# Patient Record
Sex: FEMALE | Race: OTHER | NOT HISPANIC OR LATINO | URBAN - METROPOLITAN AREA
[De-identification: names, ages, dates, MRNs, and addresses within clinical notes are randomized per-mention and may not be internally consistent; named-entity substitution may affect disease eponyms.]

---

## 2020-12-08 ENCOUNTER — OUTPATIENT (OUTPATIENT)
Dept: OUTPATIENT SERVICES | Facility: HOSPITAL | Age: 75
LOS: 1 days | Discharge: HOME | End: 2020-12-08
Payer: MEDICAID

## 2020-12-08 DIAGNOSIS — C50.919 MALIGNANT NEOPLASM OF UNSPECIFIED SITE OF UNSPECIFIED FEMALE BREAST: ICD-10-CM

## 2020-12-10 ENCOUNTER — RESULT REVIEW (OUTPATIENT)
Age: 75
End: 2020-12-10

## 2020-12-10 PROCEDURE — 88321 CONSLTJ&REPRT SLD PREP ELSWR: CPT

## 2020-12-10 RX ORDER — CHROMIUM 200 MCG
TABLET ORAL
Refills: 0 | Status: ACTIVE | COMMUNITY

## 2020-12-11 ENCOUNTER — RESULT REVIEW (OUTPATIENT)
Age: 75
End: 2020-12-11

## 2020-12-11 ENCOUNTER — APPOINTMENT (OUTPATIENT)
Dept: HEMATOLOGY ONCOLOGY | Facility: CLINIC | Age: 75
End: 2020-12-11

## 2020-12-11 ENCOUNTER — APPOINTMENT (OUTPATIENT)
Dept: HEMATOLOGY ONCOLOGY | Facility: CLINIC | Age: 75
End: 2020-12-11
Payer: MEDICAID

## 2020-12-11 VITALS
TEMPERATURE: 97.3 F | WEIGHT: 138.67 LBS | HEART RATE: 86 BPM | RESPIRATION RATE: 16 BRPM | SYSTOLIC BLOOD PRESSURE: 111 MMHG | OXYGEN SATURATION: 96 % | BODY MASS INDEX: 28.33 KG/M2 | DIASTOLIC BLOOD PRESSURE: 73 MMHG | HEIGHT: 58.66 IN

## 2020-12-11 LAB
BASOPHILS # BLD AUTO: 0.02 K/UL — SIGNIFICANT CHANGE UP (ref 0–0.2)
BASOPHILS NFR BLD AUTO: 0.2 % — SIGNIFICANT CHANGE UP (ref 0–2)
EOSINOPHIL # BLD AUTO: 0.13 K/UL — SIGNIFICANT CHANGE UP (ref 0–0.5)
EOSINOPHIL NFR BLD AUTO: 1.4 % — SIGNIFICANT CHANGE UP (ref 0–6)
HCT VFR BLD CALC: 33.5 % — LOW (ref 34.5–45)
HGB BLD-MCNC: 10.6 G/DL — LOW (ref 11.5–15.5)
IMM GRANULOCYTES NFR BLD AUTO: 0.4 % — SIGNIFICANT CHANGE UP (ref 0–1.5)
LYMPHOCYTES # BLD AUTO: 2.52 K/UL — SIGNIFICANT CHANGE UP (ref 1–3.3)
LYMPHOCYTES # BLD AUTO: 27.9 % — SIGNIFICANT CHANGE UP (ref 13–44)
MCHC RBC-ENTMCNC: 28.5 PG — SIGNIFICANT CHANGE UP (ref 27–34)
MCHC RBC-ENTMCNC: 31.6 G/DL — LOW (ref 32–36)
MCV RBC AUTO: 90.1 FL — SIGNIFICANT CHANGE UP (ref 80–100)
MONOCYTES # BLD AUTO: 0.57 K/UL — SIGNIFICANT CHANGE UP (ref 0–0.9)
MONOCYTES NFR BLD AUTO: 6.3 % — SIGNIFICANT CHANGE UP (ref 2–14)
NEUTROPHILS # BLD AUTO: 5.74 K/UL — SIGNIFICANT CHANGE UP (ref 1.8–7.4)
NEUTROPHILS NFR BLD AUTO: 63.8 % — SIGNIFICANT CHANGE UP (ref 43–77)
NRBC # BLD: 0 /100 WBCS — SIGNIFICANT CHANGE UP (ref 0–0)
PLATELET # BLD AUTO: 203 K/UL — SIGNIFICANT CHANGE UP (ref 150–400)
RBC # BLD: 3.72 M/UL — LOW (ref 3.8–5.2)
RBC # FLD: 14.7 % — HIGH (ref 10.3–14.5)
WBC # BLD: 9.02 K/UL — SIGNIFICANT CHANGE UP (ref 3.8–10.5)
WBC # FLD AUTO: 9.02 K/UL — SIGNIFICANT CHANGE UP (ref 3.8–10.5)

## 2020-12-11 PROCEDURE — 99072 ADDL SUPL MATRL&STAF TM PHE: CPT

## 2020-12-11 PROCEDURE — 99205 OFFICE O/P NEW HI 60 MIN: CPT

## 2020-12-15 LAB
ALBUMIN SERPL ELPH-MCNC: 4 G/DL
ALP BLD-CCNC: 87 U/L
ALT SERPL-CCNC: 14 U/L
ANION GAP SERPL CALC-SCNC: 14 MMOL/L
AST SERPL-CCNC: 12 U/L
BILIRUB SERPL-MCNC: 0.3 MG/DL
BUN SERPL-MCNC: 18 MG/DL
CALCIUM SERPL-MCNC: 9.8 MG/DL
CHLORIDE SERPL-SCNC: 100 MMOL/L
CO2 SERPL-SCNC: 26 MMOL/L
CREAT SERPL-MCNC: 0.93 MG/DL
GLUCOSE SERPL-MCNC: 174 MG/DL
HAV IGM SER QL: NONREACTIVE
HBV CORE IGM SER QL: NONREACTIVE
HBV SURFACE AG SER QL: NONREACTIVE
HCV AB SER QL: NONREACTIVE
HCV S/CO RATIO: 0.18 S/CO
POTASSIUM SERPL-SCNC: 4.2 MMOL/L
PROT SERPL-MCNC: 7 G/DL
SODIUM SERPL-SCNC: 140 MMOL/L

## 2020-12-17 NOTE — HISTORY OF PRESENT ILLNESS
[de-identified] : 76 y/o female who presents for initial breast medical oncology consultation.\par \par The patient has a history of right breast IDC, ER/MN+, HER2-, [id number], diagnosed in 2015.  The patient underwent a right lumpectomy with SLNB at [facility] with negative margins, 5/5 negative LNs and low oncotype score.  She was followed by radiation therapy completed 3/12/2015.  The patient resided in [facility] and took Arimidex for only 6 months.  She followed up with oncologist [place] [address] and was restarted on the Arimidex 4/20/2016.  \par \par She was followed with routine mammograms annually.  On routine mammogram and sonogram done 10/7/20, a right breast mass at the 8:00 zxis, 7 cm from the nipple was discovered and biopsy was recommended.  The biopsy was done on 10/29/20 and pathology revealed an invasive ductal carcinoma, grade 3 ER/MN negative, HER2 IHC: negative.  \par \par The patient a right mastectomy with SLNB by Dr. Nidhi Wilks on  12/1/20.  Surgical pathology showed: Invasive ductal carcinoma

## 2020-12-24 ENCOUNTER — APPOINTMENT (OUTPATIENT)
Dept: NUCLEAR MEDICINE | Facility: CLINIC | Age: 75
End: 2020-12-24
Payer: MEDICAID

## 2020-12-24 ENCOUNTER — RESULT REVIEW (OUTPATIENT)
Age: 75
End: 2020-12-24

## 2020-12-24 ENCOUNTER — OUTPATIENT (OUTPATIENT)
Dept: OUTPATIENT SERVICES | Facility: HOSPITAL | Age: 75
LOS: 1 days | End: 2020-12-24

## 2020-12-24 DIAGNOSIS — C50.919 MALIGNANT NEOPLASM OF UNSPECIFIED SITE OF UNSPECIFIED FEMALE BREAST: ICD-10-CM

## 2020-12-24 PROCEDURE — 78830 RP LOCLZJ TUM SPECT W/CT 1: CPT | Mod: 26

## 2020-12-24 PROCEDURE — 78306 BONE IMAGING WHOLE BODY: CPT | Mod: 26

## 2021-01-05 ENCOUNTER — LABORATORY RESULT (OUTPATIENT)
Age: 76
End: 2021-01-05

## 2021-01-06 ENCOUNTER — RESULT REVIEW (OUTPATIENT)
Age: 76
End: 2021-01-06

## 2021-01-06 ENCOUNTER — APPOINTMENT (OUTPATIENT)
Dept: INFUSION THERAPY | Facility: HOSPITAL | Age: 76
End: 2021-01-06

## 2021-01-06 ENCOUNTER — LABORATORY RESULT (OUTPATIENT)
Age: 76
End: 2021-01-06

## 2021-01-06 ENCOUNTER — APPOINTMENT (OUTPATIENT)
Dept: HEMATOLOGY ONCOLOGY | Facility: CLINIC | Age: 76
End: 2021-01-06
Payer: MEDICAID

## 2021-01-06 VITALS
HEIGHT: 58.66 IN | BODY MASS INDEX: 28.69 KG/M2 | SYSTOLIC BLOOD PRESSURE: 126 MMHG | TEMPERATURE: 98.2 F | WEIGHT: 140.43 LBS | HEART RATE: 96 BPM | RESPIRATION RATE: 16 BRPM | DIASTOLIC BLOOD PRESSURE: 77 MMHG | OXYGEN SATURATION: 97 %

## 2021-01-06 LAB
BASOPHILS # BLD AUTO: 0.04 K/UL — SIGNIFICANT CHANGE UP (ref 0–0.2)
BASOPHILS NFR BLD AUTO: 0.5 % — SIGNIFICANT CHANGE UP (ref 0–2)
EOSINOPHIL # BLD AUTO: 0.14 K/UL — SIGNIFICANT CHANGE UP (ref 0–0.5)
EOSINOPHIL NFR BLD AUTO: 1.7 % — SIGNIFICANT CHANGE UP (ref 0–6)
HCT VFR BLD CALC: 34.9 % — SIGNIFICANT CHANGE UP (ref 34.5–45)
HGB BLD-MCNC: 10.7 G/DL — LOW (ref 11.5–15.5)
IMM GRANULOCYTES NFR BLD AUTO: 1.1 % — SIGNIFICANT CHANGE UP (ref 0–1.5)
LYMPHOCYTES # BLD AUTO: 2.23 K/UL — SIGNIFICANT CHANGE UP (ref 1–3.3)
LYMPHOCYTES # BLD AUTO: 26.5 % — SIGNIFICANT CHANGE UP (ref 13–44)
MCHC RBC-ENTMCNC: 27 PG — SIGNIFICANT CHANGE UP (ref 27–34)
MCHC RBC-ENTMCNC: 30.7 G/DL — LOW (ref 32–36)
MCV RBC AUTO: 87.9 FL — SIGNIFICANT CHANGE UP (ref 80–100)
MONOCYTES # BLD AUTO: 0.58 K/UL — SIGNIFICANT CHANGE UP (ref 0–0.9)
MONOCYTES NFR BLD AUTO: 6.9 % — SIGNIFICANT CHANGE UP (ref 2–14)
NEUTROPHILS # BLD AUTO: 5.34 K/UL — SIGNIFICANT CHANGE UP (ref 1.8–7.4)
NEUTROPHILS NFR BLD AUTO: 63.3 % — SIGNIFICANT CHANGE UP (ref 43–77)
NRBC # BLD: 0 /100 WBCS — SIGNIFICANT CHANGE UP (ref 0–0)
PLATELET # BLD AUTO: 155 K/UL — SIGNIFICANT CHANGE UP (ref 150–400)
RBC # BLD: 3.97 M/UL — SIGNIFICANT CHANGE UP (ref 3.8–5.2)
RBC # FLD: 14.6 % — HIGH (ref 10.3–14.5)
WBC # BLD: 8.42 K/UL — SIGNIFICANT CHANGE UP (ref 3.8–10.5)
WBC # FLD AUTO: 8.42 K/UL — SIGNIFICANT CHANGE UP (ref 3.8–10.5)

## 2021-01-06 PROCEDURE — 99215 OFFICE O/P EST HI 40 MIN: CPT

## 2021-01-06 PROCEDURE — 99072 ADDL SUPL MATRL&STAF TM PHE: CPT

## 2021-01-06 RX ORDER — METOCLOPRAMIDE 10 MG/1
10 TABLET ORAL EVERY 6 HOURS
Qty: 120 | Refills: 1 | Status: ACTIVE | COMMUNITY
Start: 2021-01-06 | End: 1900-01-01

## 2021-01-07 ENCOUNTER — NON-APPOINTMENT (OUTPATIENT)
Age: 76
End: 2021-01-07

## 2021-01-07 DIAGNOSIS — Z51.11 ENCOUNTER FOR ANTINEOPLASTIC CHEMOTHERAPY: ICD-10-CM

## 2021-01-07 DIAGNOSIS — Z51.89 ENCOUNTER FOR OTHER SPECIFIED AFTERCARE: ICD-10-CM

## 2021-01-07 DIAGNOSIS — R11.2 NAUSEA WITH VOMITING, UNSPECIFIED: ICD-10-CM

## 2021-01-07 PROBLEM — E78.5 HYPERLIPIDEMIA, UNSPECIFIED: Chronic | Status: ACTIVE | Noted: 2021-01-06

## 2021-01-07 PROBLEM — I10 ESSENTIAL (PRIMARY) HYPERTENSION: Chronic | Status: ACTIVE | Noted: 2021-01-06

## 2021-01-07 PROBLEM — M81.0 AGE-RELATED OSTEOPOROSIS WITHOUT CURRENT PATHOLOGICAL FRACTURE: Chronic | Status: ACTIVE | Noted: 2021-01-06

## 2021-01-07 PROBLEM — M19.90 UNSPECIFIED OSTEOARTHRITIS, UNSPECIFIED SITE: Chronic | Status: ACTIVE | Noted: 2021-01-06

## 2021-01-07 PROBLEM — E11.9 TYPE 2 DIABETES MELLITUS WITHOUT COMPLICATIONS: Chronic | Status: ACTIVE | Noted: 2021-01-06

## 2021-01-09 NOTE — ASSESSMENT
[FreeTextEntry1] : In summary, Ms. ROSEANN LU is a 75 year old female with history of stage I right breast infiltrating ductal carcinoma, ER/VA positive and HER-2 negative diagnosed in 2015.  Oncotype score was 11.  She underwent breast conservation surgery, radiation and completed anastrozole for 5 years.  Now she is diagnosed with right breast new primary.  She underwent right mastectomy on 12/1/2020 which showed a poorly differentiated 1.7cm invasive ductal carcinoma. T1c Nx Mx Stage IA.  Lymph nodes were not detected due to previous history of sentinel lymph node dissection.  Tumor was triple negative. Patient is elderly, with well-managed comorbidities and overall good performance status.  \par \par - Breast ca: On adjuvant ddCMF chemo. C 1/8 today. S/e and adjunctive meds reviewed. \par - Chemo induced anemia- Grade 1. No transfusion needed. Monitor counts.  \par - Chemo induced diarrhea-  Imodium PRN. Maintain PO hydration. BRAT diet\par - Chemo induced N/V- Will get premedications with Emend, dexamethasone and Aloxi. She will continue dexamethasone for next 3 days for antinausea prophylaxis. She will use Reglan as needed. \par - GF induced bone pain- She will take Claritin. \par - Chemo induced dysgeusia and fatigue: Encourage p.o. fluids, small frequent meals.\par - Instructed to call office and go directly to the emergency room with fever more than 100.4, shaking chills, productive cough, sore throat, shortness of breath or urinary symptoms. Patient verbalized understanding and agreement.\par - Emotional support provided, all questions answered.\par - CT scans 12/2020 reviewed. Adrenal thickening is non specific. Will repeat CT afte completion of chemo. Bone scan MARIE 12/2020\par - She is referred for genetic counselling and testing. \par \par RTO 2 weeks

## 2021-01-09 NOTE — CONSULT LETTER
[Dear  ___] : Dear  [unfilled], [Consult Letter:] : I had the pleasure of evaluating your patient, [unfilled]. [Please see my note below.] : Please see my note below. [Consult Closing:] : Thank you very much for allowing me to participate in the care of this patient.  If you have any questions, please do not hesitate to contact me. [Sincerely,] : Sincerely, [FreeTextEntry3] : Nayeli Escobedo MD\par Division of Medical Oncology and Hematology\par [facility]\par [employer] [facility]\par [place], [place]\par \par

## 2021-01-09 NOTE — PHYSICAL EXAM
[Restricted in physically strenuous activity but ambulatory and able to carry out work of a light or sedentary nature] : Status 1- Restricted in physically strenuous activity but ambulatory and able to carry out work of a light or sedentary nature, e.g., light house work, office work [Normal] : affect appropriate [de-identified] : Right mastectomy site is healing well.

## 2021-01-09 NOTE — HISTORY OF PRESENT ILLNESS
[T: ___] : T[unfilled] [N: ___] : N[unfilled] [AJCC Stage: ____] : AJCC Stage: [unfilled] [de-identified] : 74 y/o female who presents for initial breast medical oncology consultation.\par \par The patient has a history of right breast IDC, ER/ID+, HER2-, [id number], diagnosed in 2015.  The patient underwent a right lumpectomy with SLNB at [facility] with negative margins, 5/5 negative LNs and low Oncotype score.  She was followed by radiation therapy completed 3/12/2015.  The patient resided in [facility] and took Arimidex for 5 years, completed 1/2020. Dr Linares \par \par She was followed with routine mammograms annually.  Mammogram in [facility] 3/2020- abnormal. Could not get care due to the pandemic. [place] and underwent imaging on 10/7/20. A right breast mass at the 8:00 axis, 7 cm from the nipple was discovered and biopsy was recommended.  The biopsy was done on 10/29/20 and pathology revealed an invasive ductal carcinoma, grade 3 ER/ID negative, HER2 IHC: negative.  \par \par The patient underwent a right mastectomy with SLNB by Dr. Nidhi Wilks from [facility] on  12/1/20.  Surgical pathology showed: Invasive ductal carcinoma   (1.7 cm, microscopic measurement), grade 3. No angiolymphatic or perineural invasion identified. Tumor is 3 cm from posterior margin. Monckaberg's arteriosclerosis.  No lymph nodes were discovered during surgery, therefore none submitted.\par \par Pertinent History:\par PMD: Dr. Cox\par PMH: HTN, DM\par Osteoporosis on Alendronate \par The patient takes gabapentin from hip pain\par FH: Daughter with history of sarcoma of the leg; Another daughter history of uterine cancer.  \par The patient is from [facility], she is a , has three children and lives with her daughter and son-in- law\par \par Grand daughter felt communication was very difficult with [facility] and switched care to [facility]. \par \par Fosamax stopped after 5 years\par Patient is very active, spending most day doing chores. Lately has been getting tired easily. Uses cane due to arthritis pain. Independent in ADL \par  [de-identified] : Ms. ROSEANN LU  is here for a follow up appt for right breast TNBC dx 10/2020, s/p right mastectomy. \par Starting chemotherapy Today 1/6/2021, adjuvant ddCMF\par Meds reviewed. s/e reviewed again with pt and family member\par She is nervous about starting treatment, I reassured her appropriately and instructed to call if any side effects. \par Grand daughter Jennifer Davenport  on phone today\par CT scans 12/2020 reviewed. Adrenal thickening is non specific. Will repeat CT afte completion of chemo. Bone scan MARIE 12/2020

## 2021-01-09 NOTE — REASON FOR VISIT
[Other: _____] : [unfilled] [Follow-Up Visit] : a follow-up [FreeTextEntry2] : Right Breast Cancer [FreeTextEntry3] : Kinyarwanda speaking, family member translating per pt's request

## 2021-01-12 ENCOUNTER — APPOINTMENT (OUTPATIENT)
Dept: PEDIATRIC MEDICAL GENETICS | Facility: CLINIC | Age: 76
End: 2021-01-12
Payer: MEDICAID

## 2021-01-12 PROCEDURE — 99442: CPT | Mod: 95

## 2021-01-12 NOTE — HISTORY OF PRESENT ILLNESS
[Home] : at home, [unfilled] , at the time of the visit. [Other Location: e.g. Home (Enter Location, City,State)___] : at [unfilled] [Other:____] : [unfilled] [Verbal consent obtained from patient] : the patient, [unfilled]

## 2021-01-14 ENCOUNTER — OUTPATIENT (OUTPATIENT)
Dept: OUTPATIENT SERVICES | Facility: HOSPITAL | Age: 76
LOS: 1 days | Discharge: HOME | End: 2021-01-14

## 2021-01-14 DIAGNOSIS — Z90.11 ACQUIRED ABSENCE OF RIGHT BREAST AND NIPPLE: Chronic | ICD-10-CM

## 2021-01-14 DIAGNOSIS — C50.919 MALIGNANT NEOPLASM OF UNSPECIFIED SITE OF UNSPECIFIED FEMALE BREAST: ICD-10-CM

## 2021-01-20 ENCOUNTER — APPOINTMENT (OUTPATIENT)
Dept: HEMATOLOGY ONCOLOGY | Facility: CLINIC | Age: 76
End: 2021-01-20

## 2021-01-20 ENCOUNTER — RESULT REVIEW (OUTPATIENT)
Age: 76
End: 2021-01-20

## 2021-01-20 ENCOUNTER — APPOINTMENT (OUTPATIENT)
Dept: INFUSION THERAPY | Facility: HOSPITAL | Age: 76
End: 2021-01-20

## 2021-01-20 ENCOUNTER — LABORATORY RESULT (OUTPATIENT)
Age: 76
End: 2021-01-20

## 2021-01-20 ENCOUNTER — APPOINTMENT (OUTPATIENT)
Dept: HEMATOLOGY ONCOLOGY | Facility: CLINIC | Age: 76
End: 2021-01-20
Payer: MEDICAID

## 2021-01-20 VITALS
SYSTOLIC BLOOD PRESSURE: 129 MMHG | TEMPERATURE: 97.9 F | WEIGHT: 140.43 LBS | BODY MASS INDEX: 28.69 KG/M2 | HEIGHT: 58.66 IN | DIASTOLIC BLOOD PRESSURE: 79 MMHG | HEART RATE: 97 BPM | OXYGEN SATURATION: 97 % | RESPIRATION RATE: 16 BRPM

## 2021-01-20 LAB
BASOPHILS # BLD AUTO: 0.08 K/UL — SIGNIFICANT CHANGE UP (ref 0–0.2)
BASOPHILS NFR BLD AUTO: 0.7 % — SIGNIFICANT CHANGE UP (ref 0–2)
EOSINOPHIL # BLD AUTO: 0.05 K/UL — SIGNIFICANT CHANGE UP (ref 0–0.5)
EOSINOPHIL NFR BLD AUTO: 0.4 % — SIGNIFICANT CHANGE UP (ref 0–6)
HCT VFR BLD CALC: 32.9 % — LOW (ref 34.5–45)
HGB BLD-MCNC: 10.4 G/DL — LOW (ref 11.5–15.5)
IMM GRANULOCYTES NFR BLD AUTO: 7 % — HIGH (ref 0–1.5)
LYMPHOCYTES # BLD AUTO: 1.92 K/UL — SIGNIFICANT CHANGE UP (ref 1–3.3)
LYMPHOCYTES # BLD AUTO: 16.9 % — SIGNIFICANT CHANGE UP (ref 13–44)
MCHC RBC-ENTMCNC: 27.3 PG — SIGNIFICANT CHANGE UP (ref 27–34)
MCHC RBC-ENTMCNC: 31.6 G/DL — LOW (ref 32–36)
MCV RBC AUTO: 86.4 FL — SIGNIFICANT CHANGE UP (ref 80–100)
MONOCYTES # BLD AUTO: 0.87 K/UL — SIGNIFICANT CHANGE UP (ref 0–0.9)
MONOCYTES NFR BLD AUTO: 7.6 % — SIGNIFICANT CHANGE UP (ref 2–14)
NEUTROPHILS # BLD AUTO: 7.67 K/UL — HIGH (ref 1.8–7.4)
NEUTROPHILS NFR BLD AUTO: 67.4 % — SIGNIFICANT CHANGE UP (ref 43–77)
NRBC # BLD: 0 /100 WBCS — SIGNIFICANT CHANGE UP (ref 0–0)
PLATELET # BLD AUTO: 97 K/UL — LOW (ref 150–400)
RBC # BLD: 3.81 M/UL — SIGNIFICANT CHANGE UP (ref 3.8–5.2)
RBC # FLD: 15.7 % — HIGH (ref 10.3–14.5)
WBC # BLD: 11.39 K/UL — HIGH (ref 3.8–10.5)
WBC # FLD AUTO: 11.39 K/UL — HIGH (ref 3.8–10.5)

## 2021-01-20 PROCEDURE — 99215 OFFICE O/P EST HI 40 MIN: CPT

## 2021-01-20 PROCEDURE — 99072 ADDL SUPL MATRL&STAF TM PHE: CPT

## 2021-01-27 ENCOUNTER — RESULT REVIEW (OUTPATIENT)
Age: 76
End: 2021-01-27

## 2021-01-27 ENCOUNTER — APPOINTMENT (OUTPATIENT)
Dept: INFUSION THERAPY | Facility: HOSPITAL | Age: 76
End: 2021-01-27

## 2021-01-27 ENCOUNTER — APPOINTMENT (OUTPATIENT)
Dept: HEMATOLOGY ONCOLOGY | Facility: CLINIC | Age: 76
End: 2021-01-27
Payer: MEDICAID

## 2021-01-27 ENCOUNTER — LABORATORY RESULT (OUTPATIENT)
Age: 76
End: 2021-01-27

## 2021-01-27 VITALS
WEIGHT: 138.89 LBS | RESPIRATION RATE: 18 BRPM | TEMPERATURE: 98.1 F | BODY MASS INDEX: 28.38 KG/M2 | OXYGEN SATURATION: 96 % | SYSTOLIC BLOOD PRESSURE: 119 MMHG | HEIGHT: 58.66 IN | HEART RATE: 98 BPM | DIASTOLIC BLOOD PRESSURE: 74 MMHG

## 2021-01-27 LAB
BASOPHILS # BLD AUTO: 0.03 K/UL — SIGNIFICANT CHANGE UP (ref 0–0.2)
BASOPHILS NFR BLD AUTO: 0.3 % — SIGNIFICANT CHANGE UP (ref 0–2)
EOSINOPHIL # BLD AUTO: 0.06 K/UL — SIGNIFICANT CHANGE UP (ref 0–0.5)
EOSINOPHIL NFR BLD AUTO: 0.6 % — SIGNIFICANT CHANGE UP (ref 0–6)
HCT VFR BLD CALC: 31.4 % — LOW (ref 34.5–45)
HGB BLD-MCNC: 10.2 G/DL — LOW (ref 11.5–15.5)
IMM GRANULOCYTES NFR BLD AUTO: 1.1 % — SIGNIFICANT CHANGE UP (ref 0–1.5)
LYMPHOCYTES # BLD AUTO: 2.36 K/UL — SIGNIFICANT CHANGE UP (ref 1–3.3)
LYMPHOCYTES # BLD AUTO: 23.1 % — SIGNIFICANT CHANGE UP (ref 13–44)
MCHC RBC-ENTMCNC: 27.8 PG — SIGNIFICANT CHANGE UP (ref 27–34)
MCHC RBC-ENTMCNC: 32.5 G/DL — SIGNIFICANT CHANGE UP (ref 32–36)
MCV RBC AUTO: 85.6 FL — SIGNIFICANT CHANGE UP (ref 80–100)
MONOCYTES # BLD AUTO: 0.64 K/UL — SIGNIFICANT CHANGE UP (ref 0–0.9)
MONOCYTES NFR BLD AUTO: 6.3 % — SIGNIFICANT CHANGE UP (ref 2–14)
NEUTROPHILS # BLD AUTO: 7.01 K/UL — SIGNIFICANT CHANGE UP (ref 1.8–7.4)
NEUTROPHILS NFR BLD AUTO: 68.6 % — SIGNIFICANT CHANGE UP (ref 43–77)
NRBC # BLD: 0 /100 WBCS — SIGNIFICANT CHANGE UP (ref 0–0)
PLATELET # BLD AUTO: 164 K/UL — SIGNIFICANT CHANGE UP (ref 150–400)
RBC # BLD: 3.67 M/UL — LOW (ref 3.8–5.2)
RBC # FLD: 16.4 % — HIGH (ref 10.3–14.5)
WBC # BLD: 10.21 K/UL — SIGNIFICANT CHANGE UP (ref 3.8–10.5)
WBC # FLD AUTO: 10.21 K/UL — SIGNIFICANT CHANGE UP (ref 3.8–10.5)

## 2021-01-27 PROCEDURE — 99072 ADDL SUPL MATRL&STAF TM PHE: CPT

## 2021-01-27 PROCEDURE — 99214 OFFICE O/P EST MOD 30 MIN: CPT

## 2021-01-28 DIAGNOSIS — Z51.11 ENCOUNTER FOR ANTINEOPLASTIC CHEMOTHERAPY: ICD-10-CM

## 2021-01-28 DIAGNOSIS — R11.2 NAUSEA WITH VOMITING, UNSPECIFIED: ICD-10-CM

## 2021-01-28 DIAGNOSIS — Z51.89 ENCOUNTER FOR OTHER SPECIFIED AFTERCARE: ICD-10-CM

## 2021-02-01 NOTE — ASSESSMENT
[FreeTextEntry1] : In summary, Ms. ROSEANN LU is a 75 year old female with history of stage I right breast infiltrating ductal carcinoma, ER/VT positive and HER-2 negative diagnosed in 2015.  Oncotype score was 11.  She underwent breast conservation surgery, radiation and completed anastrozole for 5 years.  Now she is diagnosed with right breast new primary.  She underwent right mastectomy on 12/1/2020 which showed a poorly differentiated 1.7cm invasive ductal carcinoma. T1c Nx Mx Stage IA.  Lymph nodes were not detected due to previous history of sentinel lymph node dissection.  Tumor was triple negative. Patient is elderly, with well-managed comorbidities and overall good performance status.  \par \par - Breast ca: On adjuvant CMF chemo. Pt could not tolerate q2w dose dense regimen therefore switched to q3w. C 2/8 today. Chemo tolerated with expected side effects 1/20/2021 PLT 97,000 chemo HELD and rescheduled for 1/27/21.. PLT count recovered today 1/27/2021 PLTS 164,000. \par -Chemo induces thrombocytopenia - Grade 1 Will HOLD CHEMO . Reschedule for 1/27/2021 then resume at q 3 weeks\par - Chemo induced anemia- Grade 1. No transfusion needed. Monitor counts.  \par - Chemo induced diarrhea-  Imodium PRN. Maintain PO hydration. BRAT diet\par - Chemo induced N/V- Will get premedications with Emend, dexamethasone and Aloxi. She will continue dexamethasone for next 3 days for antinausea prophylaxis. She will use Reglan as needed. \par - GF induced bone pain- She will take Claritin. \par - Chemo induced dysgeusia and fatigue: Encourage p.o. fluids, small frequent meals.\par - Instructed to call office and go directly to the emergency room with fever more than 100.4, shaking chills, productive cough, sore throat, shortness of breath or urinary symptoms. Patient verbalized understanding and agreement.\par - Emotional support provided, all questions answered.\par - CT scans 12/2020 reviewed. Adrenal thickening is non specific. Will repeat CT after completion of chemo. Bone scan MARIE 12/2020\par - She is referred for genetic counselling and testing. \par \par RTO 3 weeks for CMF with evert and MD\par D/w  Dr. Nayeli Escobedo\par

## 2021-02-01 NOTE — HISTORY OF PRESENT ILLNESS
[T: ___] : T[unfilled] [N: ___] : N[unfilled] [AJCC Stage: ____] : AJCC Stage: [unfilled] [de-identified] : 74 y/o female who presents for initial breast medical oncology consultation.\par \par The patient has a history of right breast IDC, ER/KS+, HER2-, [id number], diagnosed in 2015.  The patient underwent a right lumpectomy with SLNB at [facility] with negative margins, 5/5 negative LNs and low Oncotype score.  She was followed by radiation therapy completed 3/12/2015.  The patient resided in [facility] and took Arimidex for 5 years, completed 1/2020. Dr Linarse \par \par She was followed with routine mammograms annually.  Mammogram in [facility] 3/2020- abnormal. Could not get care due to the pandemic. [place] and [place] imaging on 10/7/20. A right breast mass at the 8:00 axis, 7 cm from the nipple was discovered and biopsy was recommended.  The biopsy was done on 10/29/20 and pathology revealed an invasive ductal carcinoma, grade 3 ER/KS negative, HER2 IHC: negative.  \par \par The patient underwent a right mastectomy with SLNB by Dr. Nidhi Wilks from [facility] on  12/1/20.  Surgical pathology showed: Invasive ductal carcinoma   (1.7 cm, microscopic measurement), grade 3. No angiolymphatic or perineural invasion identified. Tumor is 3 cm from posterior margin. Monckaberg's arteriosclerosis.  No lymph nodes were discovered during surgery, therefore none submitted.\par \par Pertinent History:\par PMD: Dr. Cox\par PMH: HTN, DM\par Osteoporosis on Alendronate \par The patient takes gabapentin from hip pain\par FH: Daughter with history of sarcoma of the leg; Another daughter history of uterine cancer.  \par The patient is from [facility], she is a , has three children and lives with her daughter and son-in- law\par \par Grand daughter felt communication was very difficult with [facility] and switched care to [facility]. \par \par Fosamax stopped after 5 years\par Patient is very active, spending most day doing chores. Lately has been getting tired easily. Uses cane due to arthritis pain. Independent in ADL \par  [de-identified] : Ms. ROSEANN LU  is here for a follow up appt for right breast TNBC dx 10/2020, s/p right mastectomy.  1/6/2021 started adjuvant dd CMF chemo q2 weeks\par  S/p CMF #1 Here for C2  held 1/20/2021 due to thrombocytopenia, rescheduled for 1/27/2021                             \par Grand daughter Jennifer Davenport  on phone today\par CT scans 12/2020 reviewed. Adrenal thickening is non specific. Will repeat CT after completion of chemo. Bone scan MARIE 12/2020\par Tolerating chemo well. She reports mild-moderate fatigue and altered taste x 3-4 days after chemo. She was able to function, maintained PO intake, weight stable. She had mild nausea, took Reglan, no vomiting, no diarrhea or mouth sores. She had mild bone pain/head aches. No neuropathy, no fevers. No hair loss. 1/20/20210 grade 2 thrombocytopenia  PLT 97,000  denies active bleeding. HOLD CHEMO today reschedule for 1/27/2020. Discussed to change to q 3 weeks as her counts didn’t recover in time for q2w regimen.\par 1/27/2021 Reports mild erythema and pain on palpation to 1/20.2021  IV site on dorsum of right hand. No swelling, streaking or  other s/s of infection noted.  Patient did not receive chemo 1/20/21 so no risk of vein irritation from chemo. . advised to apply warm compress  Counts good .000, Hgb 10.2 We will proceed with CMF C2 today 1/27/2021 and continue CMF with Onpro every 3 weeks going forward. \par

## 2021-02-01 NOTE — REASON FOR VISIT
[Follow-Up Visit] : a follow-up [Other: _____] : [unfilled] [FreeTextEntry2] : Right Breast Cancer [FreeTextEntry3] : Romansh speaking, family member, granddaughter on speaker phone translating per pt's request

## 2021-02-01 NOTE — CONSULT LETTER
[Dear  ___] : Dear  [unfilled], [Consult Letter:] : I had the pleasure of evaluating your patient, [unfilled]. [Please see my note below.] : Please see my note below. [Consult Closing:] : Thank you very much for allowing me to participate in the care of this patient.  If you have any questions, please do not hesitate to contact me. [Sincerely,] : Sincerely, [FreeTextEntry3] : Nayeli Escobedo MD\par Division of Medical Oncology and Hematology\par [facility]\par [employer] [facility]\par [place], [place]\par \par

## 2021-02-01 NOTE — PHYSICAL EXAM
[Restricted in physically strenuous activity but ambulatory and able to carry out work of a light or sedentary nature] : Status 1- Restricted in physically strenuous activity but ambulatory and able to carry out work of a light or sedentary nature, e.g., light house work, office work [Normal] : affect appropriate [de-identified] : Mild erythema noted to dorsum of right hand at IV site from 1/20/21 no swelling or s/s phlebitis noted  [de-identified] : Right mastectomy site is healing well.

## 2021-02-02 NOTE — HISTORY OF PRESENT ILLNESS
[T: ___] : T[unfilled] [N: ___] : N[unfilled] [AJCC Stage: ____] : AJCC Stage: [unfilled] [de-identified] : 76 y/o female who presents for initial breast medical oncology consultation.\par \par The patient has a history of right breast IDC, ER/CO+, HER2-, [id number], diagnosed in 2015.  The patient underwent a right lumpectomy with SLNB at [facility] with negative margins, 5/5 negative LNs and low Oncotype score.  She was followed by radiation therapy completed 3/12/2015.  The patient resided in [facility] and took Arimidex for 5 years, completed 1/2020. Dr Linares \par \par She was followed with routine mammograms annually.  Mammogram in [facility] 3/2020- abnormal. Could not get care due to the pandemic. [place] on 10/7/20. A right breast mass at the 8:00 axis, 7 cm from the nipple was discovered and biopsy was recommended.  The biopsy was done on 10/29/20 and pathology revealed an invasive ductal carcinoma, grade 3 ER/CO negative, HER2 IHC: negative.  \par \par The patient underwent a right mastectomy with SLNB by Dr. Nidhi Wilks from [facility] on  12/1/20.  Surgical pathology showed: Invasive ductal carcinoma   (1.7 cm, microscopic measurement), grade 3. No angiolymphatic or perineural invasion identified. Tumor is 3 cm from posterior margin. Monckaberg's arteriosclerosis.  No lymph nodes were discovered during surgery, therefore none submitted.\par \par Pertinent History:\par PMD: Dr. Cox\par PMH: HTN, DM\par Osteoporosis on Alendronate \par The patient takes gabapentin from hip pain\par FH: Daughter with history of sarcoma of the leg; Another daughter history of uterine cancer.  \par The patient is from [facility], she is a , has three children and lives with her daughter and son-in- law\par \par Grand daughter felt communication was very difficult with [facility] and [facility] care to [facility]. \par \par Fosamax stopped after 5 years\par Patient is very active, spending most day doing chores. Lately has been getting tired easily. Uses cane due to arthritis pain. Independent in ADL \par  [de-identified] : Ms. ROSEANN LU  is here for a follow up appt for right breast TNBC dx 10/2020, s/p right mastectomy.  1/6/2021 started adjuvant dd CMF chemo q2 weeks\par  S/p CMF #1 Here for C2 today 1/20/2021                             \par Meds reviewed. s/e reviewed again with pt and family member\par She is nervous about starting treatment, I reassured her appropriately and instructed to call if any side effects. \par Grand daughter Jennifer Davenport  on phone today\par CT scans 12/2020 reviewed. Adrenal thickening is non specific. Will repeat CT after completion of chemo. Bone scan MARIE 12/2020\par Tolerating well. She reports mild-moderate fatigue and altered taste x 3-4 days after chemo. She was able to function, maintained PO intake, weight stable. She had mild nausea, took Reglan, no vomiting, no diarrhea or mouth sores. She had mild bone pain/head aches. No neuropathy, no fevers. No hair loss. 1/20/20210 grade 1 thrombocytopenia today PLT 97,000  denies active bleeding. HOLD CHEMO today reschedule for 1/27/2020 then q 3 weeks.\par \par \par

## 2021-02-02 NOTE — REASON FOR VISIT
[Follow-Up Visit] : a follow-up [Other: _____] : [unfilled] [FreeTextEntry2] : Right Breast Cancer [FreeTextEntry3] : Swedish speaking, family member translating per pt's request

## 2021-02-02 NOTE — ASSESSMENT
[FreeTextEntry1] : In summary, Ms. ROSEANN LU is a 75 year old female with history of stage I right breast infiltrating ductal carcinoma, ER/AL positive and HER-2 negative diagnosed in 2015.  Oncotype score was 11.  She underwent breast conservation surgery, radiation and completed anastrozole for 5 years.  Now she is diagnosed with right breast new primary.  She underwent right mastectomy on 12/1/2020 which showed a poorly differentiated 1.7cm invasive ductal carcinoma. T1c Nx Mx Stage IA.  Lymph nodes were not detected due to previous history of sentinel lymph node dissection.  Tumor was triple negative. Patient is elderly, with well-managed comorbidities and overall good performance status.  \par \par - Breast ca: On adjuvant ddCMF chemo. C 2/8 today. S/e and adjunctive meds reviewed. Tolerated with expected side effects 1/20/2021 PLT 97,000 we will hold chemo today and esume at q 3 weeks instead of every 2 week for better count recovery.\par -Chemo induces thrombocytopenia - Grade 1 Will HOLD CHEMO today. Reschedule for 1/27/2021 then resume at q 3 weeks\par - Chemo induced anemia- Grade 1. No transfusion needed. Monitor counts.  \par - Chemo induced diarrhea-  Imodium PRN. Maintain PO hydration. BRAT diet\par - Chemo induced N/V- Will get premedications with Emend, dexamethasone and Aloxi. She will continue dexamethasone for next 3 days for antinausea prophylaxis. She will use Reglan as needed. \par - GF induced bone pain- She will take Claritin. \par - Chemo induced dysgeusia and fatigue: Encourage p.o. fluids, small frequent meals.\par - Instructed to call office and go directly to the emergency room with fever more than 100.4, shaking chills, productive cough, sore throat, shortness of breath or urinary symptoms. Patient verbalized understanding and agreement.\par - Emotional support provided, all questions answered.\par - CT scans 12/2020 reviewed. Adrenal thickening is non specific. Will repeat CT afte completion of chemo. Bone scan MARIE 12/2020\par - She is referred for genetic counselling and testing. \par \par 1/20/21 CHEMO HOLD\par RTO next week for tx 1/27/2021\par RTO 3 weeks

## 2021-02-02 NOTE — ASSESSMENT
[FreeTextEntry1] : In summary, Ms. ROSEANN LU is a 75 year old female with history of stage I right breast infiltrating ductal carcinoma, ER/[place] positive and HER-2 negative diagnosed in 2015.  Oncotype score was 11.  She underwent breast conservation surgery, radiation and completed anastrozole for 5 years.  Now she is diagnosed with right breast new primary.  She underwent right mastectomy on 12/1/2020 which showed a poorly differentiated 1.7cm invasive ductal carcinoma. T1c Nx Mx Stage IA.  Lymph nodes were not detected due to previous history of sentinel lymph node dissection.  Tumor was triple negative. Patient is elderly, with well-managed comorbidities and overall good performance status.  \par \par - Breast ca: On adjuvant ddCMF chemo. C 2/8 today. S/e and adjunctive meds reviewed. Tolerated with expected side effects 1/20/2021 PLT 97,000 we will hold chemo today and esume at q 3 weeks instead of every 2 week for better count recovery.\par -Chemo induces thrombocytopenia - Grade 1 Will HOLD CHEMO today. Reschedule for 1/27/2021 then resume at q 3 weeks\par - Chemo induced anemia- Grade 1. No transfusion needed. Monitor counts.  \par - Chemo induced diarrhea-  Imodium PRN. Maintain PO hydration. BRAT diet\par - Chemo induced N/V- Will get premedications with Emend, dexamethasone and Aloxi. She will continue dexamethasone for next 3 days for antinausea prophylaxis. She will use Reglan as needed. \par - GF induced bone pain- She will take Claritin. \par - Chemo induced dysgeusia and fatigue: Encourage p.o. fluids, small frequent meals.\par - Instructed to call office and go directly to the emergency room with fever more than 100.4, shaking chills, productive cough, sore throat, shortness of breath or urinary symptoms. Patient verbalized understanding and agreement.\par - Emotional support provided, all questions answered.\par - CT scans 12/2020 reviewed. Adrenal thickening is non specific. Will repeat CT afte completion of chemo. Bone scan MARIE 12/2020\par - She is referred for genetic counselling and testing. \par \par 1/20/21 CHEMO HOLD\par RTO next week for tx 1/27/2021\par RTO 3 weeks fair plus

## 2021-02-02 NOTE — PHYSICAL EXAM
[Restricted in physically strenuous activity but ambulatory and able to carry out work of a light or sedentary nature] : Status 1- Restricted in physically strenuous activity but ambulatory and able to carry out work of a light or sedentary nature, e.g., light house work, office work [Normal] : affect appropriate [de-identified] : Right mastectomy site is healing well.

## 2021-02-02 NOTE — HISTORY OF PRESENT ILLNESS
[T: ___] : T[unfilled] [N: ___] : N[unfilled] [AJCC Stage: ____] : AJCC Stage: [unfilled] [de-identified] : 76 y/o female who presents for initial breast medical oncology consultation.\par \par The patient has a history of right breast IDC, ER/MD+, HER2-, [id number], diagnosed in 2015.  The patient underwent a right lumpectomy with SLNB at [facility] with negative margins, 5/5 negative LNs and low Oncotype score.  She was followed by radiation therapy completed 3/12/2015.  The patient resided in [facility] and took Arimidex for 5 years, completed 1/2020. Dr Linares \par \par She was followed with routine mammograms annually.  Mammogram in [facility] 3/2020- abnormal. Could not get care due to the pandemic. [place] and underwent imaging on 10/7/20. A right breast mass at the 8:00 axis, 7 cm from the nipple was discovered and biopsy was recommended.  The biopsy was done on 10/29/20 and pathology revealed an invasive ductal carcinoma, grade 3 ER/MD negative, HER2 IHC: negative.  \par \par The patient underwent a right mastectomy with SLNB by Dr. Nidhi Wilks from [facility] on  12/1/20.  Surgical pathology showed: Invasive ductal carcinoma   (1.7 cm, microscopic measurement), grade 3. No angiolymphatic or perineural invasion identified. Tumor is 3 cm from posterior margin. Monckaberg's arteriosclerosis.  No lymph nodes were discovered during surgery, therefore none submitted.\par \par Pertinent History:\par PMD: Dr. Cox\par PMH: HTN, DM\par Osteoporosis on Alendronate \par The patient takes gabapentin from hip pain\par FH: Daughter with history of sarcoma of the leg; Another daughter history of uterine cancer.  \par The patient is from [facility], she is a , has three children and lives with her daughter and son-in- law\par \par Grand daughter felt communication was very difficult with [facility] and switched care to [facility]. \par \par Fosamax stopped after 5 years\par Patient is very active, spending most day doing chores. Lately has been getting tired easily. Uses cane due to arthritis pain. Independent in ADL \par  [de-identified] : Ms. ROSEANN LU  is here for a follow up appt for right breast TNBC dx 10/2020, s/p right mastectomy.  1/6/2021 started adjuvant dd CMF chemo q2 weeks\par  S/p CMF #1 Here for C2 today 1/20/2021                             \par Meds reviewed. s/e reviewed again with pt and family member\par She is nervous about starting treatment, I reassured her appropriately and instructed to call if any side effects. \par Grand daughter Jennifer Davenport  on phone today\par CT scans 12/2020 reviewed. Adrenal thickening is non specific. Will repeat CT after completion of chemo. Bone scan MARIE 12/2020\par Tolerating well. She reports mild-moderate fatigue and altered taste x 3-4 days after chemo. She was able to function, maintained PO intake, weight stable. She had mild nausea, took Reglan, no vomiting, no diarrhea or mouth sores. She had mild bone pain/head aches. No neuropathy, no fevers. No hair loss. 1/20/20210 grade 1 thrombocytopenia today PLT 97,000  denies active bleeding. HOLD CHEMO today reschedule for 1/27/2020 then q 3 weeks.\par \par \par

## 2021-02-02 NOTE — PHYSICAL EXAM
[Restricted in physically strenuous activity but ambulatory and able to carry out work of a light or sedentary nature] : Status 1- Restricted in physically strenuous activity but ambulatory and able to carry out work of a light or sedentary nature, e.g., light house work, office work [Normal] : affect appropriate [de-identified] : Right mastectomy site is healing well.

## 2021-02-02 NOTE — REASON FOR VISIT
[Follow-Up Visit] : a follow-up [Other: _____] : [unfilled] [FreeTextEntry2] : Right Breast Cancer [FreeTextEntry3] : Greenlandic speaking, family member translating per pt's request

## 2021-02-02 NOTE — CONSULT LETTER
[Dear  ___] : Dear  [unfilled], [Consult Letter:] : I had the pleasure of evaluating your patient, [unfilled]. [Please see my note below.] : Please see my note below. [Consult Closing:] : Thank you very much for allowing me to participate in the care of this patient.  If you have any questions, please do not hesitate to contact me. [Sincerely,] : Sincerely, [FreeTextEntry3] : Nayeli Escobedo MD\par Division of Medical Oncology and Hematology\par [facility] [place]\par [employer] [facility]\par [place], [place]\par \par

## 2021-02-02 NOTE — CONSULT LETTER
[Dear  ___] : Dear  [unfilled], [Consult Letter:] : I had the pleasure of evaluating your patient, [unfilled]. [Please see my note below.] : Please see my note below. [Consult Closing:] : Thank you very much for allowing me to participate in the care of this patient.  If you have any questions, please do not hesitate to contact me. [Sincerely,] : Sincerely, [FreeTextEntry3] : Nayeli Escobedo MD\par Division of Medical Oncology and Hematology\par [facility] [place]\par [employer] at [facility]\par [place], [place]\par \par

## 2021-02-03 ENCOUNTER — APPOINTMENT (OUTPATIENT)
Dept: INFUSION THERAPY | Facility: HOSPITAL | Age: 76
End: 2021-02-03

## 2021-02-03 ENCOUNTER — APPOINTMENT (OUTPATIENT)
Dept: HEMATOLOGY ONCOLOGY | Facility: CLINIC | Age: 76
End: 2021-02-03

## 2021-02-03 ENCOUNTER — APPOINTMENT (OUTPATIENT)
Dept: ENDOCRINOLOGY | Facility: CLINIC | Age: 76
End: 2021-02-03
Payer: MEDICAID

## 2021-02-03 VITALS
OXYGEN SATURATION: 98 % | SYSTOLIC BLOOD PRESSURE: 99 MMHG | BODY MASS INDEX: 28.35 KG/M2 | DIASTOLIC BLOOD PRESSURE: 62 MMHG | HEART RATE: 93 BPM | WEIGHT: 136.89 LBS | TEMPERATURE: 98 F | HEIGHT: 58.46 IN

## 2021-02-03 LAB — GLUCOSE BLDC GLUCOMTR-MCNC: 154

## 2021-02-03 PROCEDURE — 99205 OFFICE O/P NEW HI 60 MIN: CPT | Mod: 25

## 2021-02-03 PROCEDURE — 99072 ADDL SUPL MATRL&STAF TM PHE: CPT

## 2021-02-04 ENCOUNTER — APPOINTMENT (OUTPATIENT)
Dept: ENDOCRINOLOGY | Facility: CLINIC | Age: 76
End: 2021-02-04
Payer: MEDICAID

## 2021-02-04 PROCEDURE — G0108 DIAB MANAGE TRN  PER INDIV: CPT

## 2021-02-04 PROCEDURE — 99072 ADDL SUPL MATRL&STAF TM PHE: CPT

## 2021-02-05 DIAGNOSIS — E27.8 OTHER SPECIFIED DISORDERS OF ADRENAL GLAND: ICD-10-CM

## 2021-02-05 LAB
25(OH)D3 SERPL-MCNC: 24.5 NG/ML
ALDOSTERONE SERUM: 11.3 NG/DL
CHOLEST SERPL-MCNC: 137 MG/DL
DHEA-S SERPL-MCNC: 127 UG/DL
FRUCTOSAMINE SERPL-MCNC: 246 UMOL/L
HDLC SERPL-MCNC: 68 MG/DL
LDLC SERPL CALC-MCNC: 49 MG/DL
NONHDLC SERPL-MCNC: 69 MG/DL
TRIGL SERPL-MCNC: 100 MG/DL
TSH SERPL-ACNC: 2.17 UIU/ML
VIT B12 SERPL-MCNC: >2000 PG/ML

## 2021-02-05 NOTE — PHYSICAL EXAM
[Alert] : alert [Well Nourished] : well nourished [Healthy Appearance] : healthy appearance [No Acute Distress] : no acute distress [Well Developed] : well developed [Normal Voice/Communication] : normal voice communication [Normal Sclera/Conjunctiva] : normal sclera/conjunctiva [No Proptosis] : no proptosis [No Neck Mass] : no neck mass was observed [No LAD] : no lymphadenopathy [Supple] : the neck was supple [Thyroid Not Enlarged] : the thyroid was not enlarged [No Thyroid Nodules] : no palpable thyroid nodules [No Respiratory Distress] : no respiratory distress [No Accessory Muscle Use] : no accessory muscle use [Normal Rate and Effort] : normal respiratory rate and effort [Normal Rate] : heart rate was normal [No Edema] : no peripheral edema [Pedal Pulses Normal] : the pedal pulses are present [Not Distended] : not distended [No Stigmata of Cushings Syndrome] : no stigmata of Cushings Syndrome [Normal Gait] : normal gait [No Clubbing, Cyanosis] : no clubbing  or cyanosis of the fingernails [No Involuntary Movements] : no involuntary movements were seen [Acanthosis Nigricans] : acanthosis nigricans present [Right Foot Was Examined] : right foot ~C was examined [Left Foot Was Examined] : left foot ~C was examined [Normal] : normal [2+] : 2+ in the dorsalis pedis [Diminished Throughout Both Feet] : normal tactile sensation with monofilament testing throughout both feet [No Tremors] : no tremors [Normal Sensation on Monofilament Testing] : normal sensation on monofilament testing of lower extremities [Normal Affect] : the affect was normal [Normal Insight/Judgement] : insight and judgment were intact [Normal Mood] : the mood was normal

## 2021-02-05 NOTE — CONSULT LETTER
[Dear  ___] : Dear  [unfilled], [Consult Letter:] : I had the pleasure of evaluating your patient, [unfilled]. [Please see my note below.] : Please see my note below. [Consult Closing:] : Thank you very much for allowing me to participate in the care of this patient.  If you have any questions, please do not hesitate to contact me. [Sincerely,] : Sincerely, [FreeTextEntry3] : Kaleb Pope MD, FACE\par

## 2021-02-05 NOTE — ASSESSMENT
[FreeTextEntry1] : This is a 76-year-old female with type 2 diabetes mellitus, hypertension, hyperlipidemia, right 1.3 cm adrenal nodule, left ill-defined adrenal thickening, vitamin D insufficiency, osteoporosis, breast cancer status post right lumpectomy, RT, right mastectomy, GERD, here for consultation.\par 1.  T2DM\par Check fructosamine as patient has anemia.\par Appointment with CDE.\par Check fingerstick glucose in the morning and after meals when on dexamethasone.  If hyperglycemic, will add insulin versus repaglinide.\par Her last ophthalmology exam was a year ago and she denies retinopathy.  Advised to make a follow-up appointment.\par She denies neuropathy.\par She denies nephropathy.  Check urine microalbumin.\par She is on atorvastatin 40 mg daily.  Check lipid panel.\par 2.  Hypertension\par Controlled.\par 3.  Hyperlipidemia\par Check lipid panel.  Continue atorvastatin 40 mg daily pending results.\par 4.  Right 1.3 cm adrenal nodule/left ill-defined adrenal thickening\par Will check for hormonal hyperfunction.\par Check plasma free metanephrines, renin,  aldosterone, DHEA-S.\par Will check for subclinical Cushing syndrome when off steroids.\par 5.  Vitamin D insufficiency\par Check 25 vitamin D.\par 6.  Osteoporosis\par Will get DEXA results from [facility].\par

## 2021-02-05 NOTE — HISTORY OF PRESENT ILLNESS
[FreeTextEntry1] : Patient is here with her daughter who provides information.\par CC: Diabetes\par This is a 76-year-old female with type 2 diabetes mellitus, hypertension, hyperlipidemia, right 1.3 cm adrenal nodule, left ill-defined adrenal thickening, vitamin D insufficiency, osteoporosis, breast cancer status post right lumpectomy, RT, right mastectomy, GERD, here for consultation.\par Type 2 diabetes was diagnosed at the age of 60.  She is currently on Metformin 850 mg 2 times a day and glimepiride 4 mg daily.  She self monitors blood glucose 1 time a day.  Fasting 120–200s.  She denies hypoglycemia.\par She takes dexamethasone for nausea 3 days after chemotherapy.  She gets chemotherapy every 3 weeks.  Her next session will be on February 17, 2021.\par Her last ophthalmology exam was a year ago and she denies retinopathy.\par She denies neuropathy.\par She denies nephropathy.\par She is on atorvastatin 40 mg daily.\par \par CT from December 22, 2020 showed right 1.3 cm adrenal nodule and ill-defined thickening noted on the left adrenal gland.\par \par For osteoporosis, she was on alendronate for a total of 5 years.  She discontinued alendronate 2 months ago.

## 2021-02-09 ENCOUNTER — APPOINTMENT (OUTPATIENT)
Dept: PEDIATRIC MEDICAL GENETICS | Facility: CLINIC | Age: 76
End: 2021-02-09
Payer: MEDICAID

## 2021-02-09 PROCEDURE — 99441: CPT

## 2021-02-10 ENCOUNTER — NON-APPOINTMENT (OUTPATIENT)
Age: 76
End: 2021-02-10

## 2021-02-11 LAB — RENIN ACTIVITY, PLASMA: 4.51 NG/ML/HR

## 2021-02-12 ENCOUNTER — NON-APPOINTMENT (OUTPATIENT)
Age: 76
End: 2021-02-12

## 2021-02-12 ENCOUNTER — OUTPATIENT (OUTPATIENT)
Dept: OUTPATIENT SERVICES | Facility: HOSPITAL | Age: 76
LOS: 1 days | Discharge: HOME | End: 2021-02-12

## 2021-02-12 DIAGNOSIS — C50.919 MALIGNANT NEOPLASM OF UNSPECIFIED SITE OF UNSPECIFIED FEMALE BREAST: ICD-10-CM

## 2021-02-12 DIAGNOSIS — Z90.11 ACQUIRED ABSENCE OF RIGHT BREAST AND NIPPLE: Chronic | ICD-10-CM

## 2021-02-12 LAB
METANEPHRINE, PL: <10 PG/ML
NORMETANEPHRINE, PL: 117.5 PG/ML

## 2021-02-16 ENCOUNTER — LABORATORY RESULT (OUTPATIENT)
Age: 76
End: 2021-02-16

## 2021-02-16 ENCOUNTER — APPOINTMENT (OUTPATIENT)
Dept: HEMATOLOGY ONCOLOGY | Facility: CLINIC | Age: 76
End: 2021-02-16

## 2021-02-17 ENCOUNTER — RESULT REVIEW (OUTPATIENT)
Age: 76
End: 2021-02-17

## 2021-02-17 ENCOUNTER — LABORATORY RESULT (OUTPATIENT)
Age: 76
End: 2021-02-17

## 2021-02-17 ENCOUNTER — APPOINTMENT (OUTPATIENT)
Dept: INFUSION THERAPY | Facility: HOSPITAL | Age: 76
End: 2021-02-17

## 2021-02-17 ENCOUNTER — APPOINTMENT (OUTPATIENT)
Dept: HEMATOLOGY ONCOLOGY | Facility: CLINIC | Age: 76
End: 2021-02-17
Payer: MEDICAID

## 2021-02-17 VITALS
OXYGEN SATURATION: 99 % | HEART RATE: 89 BPM | HEIGHT: 59.06 IN | DIASTOLIC BLOOD PRESSURE: 73 MMHG | BODY MASS INDEX: 27.73 KG/M2 | TEMPERATURE: 97.2 F | RESPIRATION RATE: 16 BRPM | WEIGHT: 137.57 LBS | SYSTOLIC BLOOD PRESSURE: 126 MMHG

## 2021-02-17 LAB
BASOPHILS # BLD AUTO: 0.03 K/UL — SIGNIFICANT CHANGE UP (ref 0–0.2)
BASOPHILS NFR BLD AUTO: 0.3 % — SIGNIFICANT CHANGE UP (ref 0–2)
EOSINOPHIL # BLD AUTO: 0.07 K/UL — SIGNIFICANT CHANGE UP (ref 0–0.5)
EOSINOPHIL NFR BLD AUTO: 0.8 % — SIGNIFICANT CHANGE UP (ref 0–6)
HCT VFR BLD CALC: 32 % — LOW (ref 34.5–45)
HGB BLD-MCNC: 10.2 G/DL — LOW (ref 11.5–15.5)
IMM GRANULOCYTES NFR BLD AUTO: 1.1 % — SIGNIFICANT CHANGE UP (ref 0–1.5)
LYMPHOCYTES # BLD AUTO: 1.6 K/UL — SIGNIFICANT CHANGE UP (ref 1–3.3)
LYMPHOCYTES # BLD AUTO: 18.3 % — SIGNIFICANT CHANGE UP (ref 13–44)
MCHC RBC-ENTMCNC: 27.8 PG — SIGNIFICANT CHANGE UP (ref 27–34)
MCHC RBC-ENTMCNC: 31.9 G/DL — LOW (ref 32–36)
MCV RBC AUTO: 87.2 FL — SIGNIFICANT CHANGE UP (ref 80–100)
MONOCYTES # BLD AUTO: 0.46 K/UL — SIGNIFICANT CHANGE UP (ref 0–0.9)
MONOCYTES NFR BLD AUTO: 5.3 % — SIGNIFICANT CHANGE UP (ref 2–14)
NEUTROPHILS # BLD AUTO: 6.47 K/UL — SIGNIFICANT CHANGE UP (ref 1.8–7.4)
NEUTROPHILS NFR BLD AUTO: 74.2 % — SIGNIFICANT CHANGE UP (ref 43–77)
NRBC # BLD: 0 /100 WBCS — SIGNIFICANT CHANGE UP (ref 0–0)
PLATELET # BLD AUTO: 105 K/UL — LOW (ref 150–400)
RBC # BLD: 3.67 M/UL — LOW (ref 3.8–5.2)
RBC # FLD: 18.1 % — HIGH (ref 10.3–14.5)
WBC # BLD: 8.73 K/UL — SIGNIFICANT CHANGE UP (ref 3.8–10.5)
WBC # FLD AUTO: 8.73 K/UL — SIGNIFICANT CHANGE UP (ref 3.8–10.5)

## 2021-02-17 PROCEDURE — 99072 ADDL SUPL MATRL&STAF TM PHE: CPT

## 2021-02-17 PROCEDURE — 99215 OFFICE O/P EST HI 40 MIN: CPT

## 2021-02-18 DIAGNOSIS — R11.2 NAUSEA WITH VOMITING, UNSPECIFIED: ICD-10-CM

## 2021-02-18 DIAGNOSIS — Z51.11 ENCOUNTER FOR ANTINEOPLASTIC CHEMOTHERAPY: ICD-10-CM

## 2021-02-22 ENCOUNTER — APPOINTMENT (OUTPATIENT)
Dept: ENDOCRINOLOGY | Facility: CLINIC | Age: 76
End: 2021-02-22
Payer: MEDICAID

## 2021-02-22 PROCEDURE — G0108 DIAB MANAGE TRN  PER INDIV: CPT

## 2021-02-22 PROCEDURE — 99072 ADDL SUPL MATRL&STAF TM PHE: CPT

## 2021-02-24 ENCOUNTER — RESULT REVIEW (OUTPATIENT)
Age: 76
End: 2021-02-24

## 2021-02-24 ENCOUNTER — APPOINTMENT (OUTPATIENT)
Dept: INFUSION THERAPY | Facility: HOSPITAL | Age: 76
End: 2021-02-24

## 2021-02-24 ENCOUNTER — LABORATORY RESULT (OUTPATIENT)
Age: 76
End: 2021-02-24

## 2021-02-24 ENCOUNTER — APPOINTMENT (OUTPATIENT)
Dept: HEMATOLOGY ONCOLOGY | Facility: CLINIC | Age: 76
End: 2021-02-24
Payer: MEDICAID

## 2021-02-24 VITALS
HEART RATE: 104 BPM | SYSTOLIC BLOOD PRESSURE: 107 MMHG | HEIGHT: 59.06 IN | OXYGEN SATURATION: 97 % | WEIGHT: 138.89 LBS | TEMPERATURE: 97.3 F | RESPIRATION RATE: 16 BRPM | BODY MASS INDEX: 28 KG/M2 | DIASTOLIC BLOOD PRESSURE: 70 MMHG

## 2021-02-24 LAB
BASOPHILS # BLD AUTO: 0.04 K/UL — SIGNIFICANT CHANGE UP (ref 0–0.2)
BASOPHILS NFR BLD AUTO: 2 % — SIGNIFICANT CHANGE UP (ref 0–2)
EOSINOPHIL # BLD AUTO: 0.12 K/UL — SIGNIFICANT CHANGE UP (ref 0–0.5)
EOSINOPHIL NFR BLD AUTO: 6 % — SIGNIFICANT CHANGE UP (ref 0–6)
HCT VFR BLD CALC: 29.8 % — LOW (ref 34.5–45)
HGB BLD-MCNC: 9.4 G/DL — LOW (ref 11.5–15.5)
LYMPHOCYTES # BLD AUTO: 1.04 K/UL — SIGNIFICANT CHANGE UP (ref 1–3.3)
LYMPHOCYTES # BLD AUTO: 54 % — HIGH (ref 13–44)
MCHC RBC-ENTMCNC: 27.8 PG — SIGNIFICANT CHANGE UP (ref 27–34)
MCHC RBC-ENTMCNC: 31.5 G/DL — LOW (ref 32–36)
MCV RBC AUTO: 88.2 FL — SIGNIFICANT CHANGE UP (ref 80–100)
MONOCYTES # BLD AUTO: 0.31 K/UL — SIGNIFICANT CHANGE UP (ref 0–0.9)
MONOCYTES NFR BLD AUTO: 16 % — HIGH (ref 2–14)
NEUTROPHILS # BLD AUTO: 0.42 K/UL — LOW (ref 1.8–7.4)
NEUTROPHILS NFR BLD AUTO: 22 % — LOW (ref 43–77)
NRBC # BLD: 0 /100 — SIGNIFICANT CHANGE UP (ref 0–0)
NRBC # BLD: SIGNIFICANT CHANGE UP /100 WBCS (ref 0–0)
PLAT MORPH BLD: NORMAL — SIGNIFICANT CHANGE UP
PLATELET # BLD AUTO: 83 K/UL — LOW (ref 150–400)
RBC # BLD: 3.38 M/UL — LOW (ref 3.8–5.2)
RBC # FLD: 18 % — HIGH (ref 10.3–14.5)
RBC BLD AUTO: SIGNIFICANT CHANGE UP
WBC # BLD: 1.92 K/UL — LOW (ref 3.8–10.5)
WBC # FLD AUTO: 1.92 K/UL — LOW (ref 3.8–10.5)

## 2021-02-24 PROCEDURE — 99214 OFFICE O/P EST MOD 30 MIN: CPT

## 2021-02-24 PROCEDURE — 99072 ADDL SUPL MATRL&STAF TM PHE: CPT

## 2021-02-27 RX ORDER — CALCIUM CARBONATE/VITAMIN D3 500MG-5MCG
500-200 TABLET ORAL
Qty: 60 | Refills: 0 | Status: ACTIVE | COMMUNITY
Start: 2021-02-23

## 2021-02-27 RX ORDER — ATORVASTATIN CALCIUM 20 MG/1
20 TABLET, FILM COATED ORAL
Qty: 30 | Refills: 0 | Status: ACTIVE | COMMUNITY
Start: 2021-02-23

## 2021-02-27 RX ORDER — GLIMEPIRIDE 4 MG/1
4 TABLET ORAL
Qty: 30 | Refills: 0 | Status: ACTIVE | COMMUNITY
Start: 2021-02-23

## 2021-02-27 RX ORDER — ISOPROPYL ALCOHOL 70 ML/100ML
70 SWAB TOPICAL
Qty: 100 | Refills: 0 | Status: ACTIVE | COMMUNITY
Start: 2021-02-23

## 2021-02-27 RX ORDER — LANCETS 32 GAUGE
EACH MISCELLANEOUS
Qty: 100 | Refills: 0 | Status: ACTIVE | COMMUNITY
Start: 2021-02-23

## 2021-02-27 RX ORDER — METFORMIN HYDROCHLORIDE 850 MG/1
850 TABLET, COATED ORAL
Qty: 60 | Refills: 0 | Status: ACTIVE | COMMUNITY
Start: 2021-02-23

## 2021-02-27 RX ORDER — BLOOD-GLUCOSE METER
W/DEVICE KIT MISCELLANEOUS
Qty: 1 | Refills: 0 | Status: ACTIVE | COMMUNITY
Start: 2021-02-23

## 2021-03-10 ENCOUNTER — RESULT REVIEW (OUTPATIENT)
Age: 76
End: 2021-03-10

## 2021-03-10 ENCOUNTER — LABORATORY RESULT (OUTPATIENT)
Age: 76
End: 2021-03-10

## 2021-03-10 ENCOUNTER — APPOINTMENT (OUTPATIENT)
Dept: HEMATOLOGY ONCOLOGY | Facility: CLINIC | Age: 76
End: 2021-03-10

## 2021-03-10 ENCOUNTER — APPOINTMENT (OUTPATIENT)
Dept: INFUSION THERAPY | Facility: HOSPITAL | Age: 76
End: 2021-03-10

## 2021-03-10 ENCOUNTER — APPOINTMENT (OUTPATIENT)
Dept: HEMATOLOGY ONCOLOGY | Facility: CLINIC | Age: 76
End: 2021-03-10
Payer: MEDICAID

## 2021-03-10 LAB
BASOPHILS # BLD AUTO: 0.04 K/UL — SIGNIFICANT CHANGE UP (ref 0–0.2)
BASOPHILS NFR BLD AUTO: 0.5 % — SIGNIFICANT CHANGE UP (ref 0–2)
EOSINOPHIL # BLD AUTO: 0.07 K/UL — SIGNIFICANT CHANGE UP (ref 0–0.5)
EOSINOPHIL NFR BLD AUTO: 0.9 % — SIGNIFICANT CHANGE UP (ref 0–6)
HCT VFR BLD CALC: 31.5 % — LOW (ref 34.5–45)
HGB BLD-MCNC: 10.1 G/DL — LOW (ref 11.5–15.5)
IMM GRANULOCYTES NFR BLD AUTO: 0.9 % — SIGNIFICANT CHANGE UP (ref 0–1.5)
LYMPHOCYTES # BLD AUTO: 1.83 K/UL — SIGNIFICANT CHANGE UP (ref 1–3.3)
LYMPHOCYTES # BLD AUTO: 23.6 % — SIGNIFICANT CHANGE UP (ref 13–44)
MCHC RBC-ENTMCNC: 28.4 PG — SIGNIFICANT CHANGE UP (ref 27–34)
MCHC RBC-ENTMCNC: 32.1 G/DL — SIGNIFICANT CHANGE UP (ref 32–36)
MCV RBC AUTO: 88.5 FL — SIGNIFICANT CHANGE UP (ref 80–100)
MONOCYTES # BLD AUTO: 0.58 K/UL — SIGNIFICANT CHANGE UP (ref 0–0.9)
MONOCYTES NFR BLD AUTO: 7.5 % — SIGNIFICANT CHANGE UP (ref 2–14)
NEUTROPHILS # BLD AUTO: 5.16 K/UL — SIGNIFICANT CHANGE UP (ref 1.8–7.4)
NEUTROPHILS NFR BLD AUTO: 66.6 % — SIGNIFICANT CHANGE UP (ref 43–77)
NRBC # BLD: 0 /100 WBCS — SIGNIFICANT CHANGE UP (ref 0–0)
PLATELET # BLD AUTO: 142 K/UL — LOW (ref 150–400)
RBC # BLD: 3.56 M/UL — LOW (ref 3.8–5.2)
RBC # FLD: 18.7 % — HIGH (ref 10.3–14.5)
WBC # BLD: 7.75 K/UL — SIGNIFICANT CHANGE UP (ref 3.8–10.5)
WBC # FLD AUTO: 7.75 K/UL — SIGNIFICANT CHANGE UP (ref 3.8–10.5)

## 2021-03-10 PROCEDURE — 99072 ADDL SUPL MATRL&STAF TM PHE: CPT

## 2021-03-10 PROCEDURE — 99215 OFFICE O/P EST HI 40 MIN: CPT

## 2021-03-10 NOTE — ASSESSMENT
[FreeTextEntry1] : In summary, Ms. ROSEANN LU is a 75 year old female with history of stage I right breast infiltrating ductal carcinoma, ER/OR positive and HER-2 negative diagnosed in 2015.  Oncotype score was 11.  She underwent breast conservation surgery, radiation and completed anastrozole for 5 years.  Now she is diagnosed with right breast new primary.  She underwent right mastectomy on 12/1/2020 which showed a poorly differentiated 1.7cm invasive ductal carcinoma. T1c Nx Mx Stage IA.  Lymph nodes were not detected due to previous history of sentinel lymph node dissection.  Tumor was triple negative. Patient is elderly, with well-managed comorbidities and overall good performance status.  \par \par - Breast ca: On adjuvant CMF chemo. Pt could not tolerate q2w dose dense regimen therefore switched to q3w. C 3/8 today. Chemo tolerated with expected side effects 1/20/2021 PLT 97,000 chemo HELD and rescheduled for 1/27/21.. PLT counts stable today  2/17/21 PLTS 105,000. Patient is on cytotoxic chemotherapy and needs intensive monitoring for severe toxicity.Here for IV fluids 2/24/21 C3D8 \par \par -Chemo induces thrombocytopenia - Grade 1 CHEMO HELD,. Reschedule for 1/27/2021 then resume at q 3 weeks PLTS stable 2/17/21,  C3D8 PLT 83.000 today  will monitor prn\par - Chemo induced anemia- Grade 2 Hgb 9.4 today. No transfusion needed. Monitor counts.\par - Increased fatigue dizziness most likely 2/2 grade 2 anemia Hgb 9.4 will add IV fluids on D5 of each cycle  \par - Chemo induced diarrhea-  Imodium PRN. Maintain PO hydration. BRAT diet\par - Chemo induced N/V- Will get premedications with Emend, dexamethasone and Aloxi. She will continue dexamethasone for next 3 days for antinausea prophylaxis. She will use Reglan as needed. \par - GF induced bone pain- She will take Claritin. \par - Chemo induced dysgeusia and fatigue: Encourage p.o. fluids, small frequent meals.\par - Instructed to call office and go directly to the emergency room with fever more than 100.4, shaking chills, productive cough, sore throat, shortness of breath or urinary symptoms. Patient verbalized understanding and agreement.\par - Emotional support provided, all questions answered.\par - CT scans 12/2020 reviewed. Adrenal thickening is non specific. Will repeat CT after completion of chemo. Bone scan MARIE 12/2020\par - She is referred for genetic counselling and testing. \par \par RTO every 3 weeks for CMF with onpro and MD D5 for iV fluids\par D/w and seen in collaboration with Dr. Nayeli Escobedo\par \par

## 2021-03-10 NOTE — REASON FOR VISIT
[Follow-Up Visit] : a follow-up [FreeTextEntry2] : Right Breast Cancer [FreeTextEntry3] : Yoruba speaking, family member, granddaughter on speaker phone translating per pt's request

## 2021-03-10 NOTE — END OF VISIT
[Time Spent: ___ minutes] : I have spent [unfilled] minutes of time on the encounter. [FreeTextEntry3] : CBC noted.  Patient has grade 4 (severe) neutropenia.  Spoke to patient's daughter, granddaughter and the patient.  She reports no issues with Neulasta last week.  We discussed need for dose reduction due to severe side effects from cytotoxic chemotherapy.  Patient and daughter was in agreement.  Cytoxan 480, methotrexate 32, 5- mg/m² for subsequent cycles along with Neulasta.

## 2021-03-10 NOTE — PHYSICAL EXAM
[Restricted in physically strenuous activity but ambulatory and able to carry out work of a light or sedentary nature] : Status 1- Restricted in physically strenuous activity but ambulatory and able to carry out work of a light or sedentary nature, e.g., light house work, office work [Normal] : affect appropriate [de-identified] : Mild erythema noted to dorsum of right hand at IV site from 1/20/21 no swelling or s/s phlebitis noted  [de-identified] : Right mastectomy site is healing well.

## 2021-03-10 NOTE — CONSULT LETTER
[Dear  ___] : Dear  [unfilled], [Consult Letter:] : I had the pleasure of evaluating your patient, [unfilled]. [Please see my note below.] : Please see my note below. [Consult Closing:] : Thank you very much for allowing me to participate in the care of this patient.  If you have any questions, please do not hesitate to contact me. [Sincerely,] : Sincerely, [FreeTextEntry3] : Nayeli Escobedo MD\par Division of Medical Oncology and Hematology\par [facility] [place]\par [employer] at [facility]\par [place], [place]\par \par

## 2021-03-10 NOTE — HISTORY OF PRESENT ILLNESS
[T: ___] : T[unfilled] [N: ___] : N[unfilled] [AJCC Stage: ____] : AJCC Stage: [unfilled] [de-identified] : 74 y/o female who presents for initial breast medical oncology consultation.\par \par The patient has a history of right breast IDC, ER/GA+, HER2-, [id number], diagnosed in 2015.  The patient underwent a right lumpectomy with SLNB at [facility] with negative margins, 5/5 negative LNs and low Oncotype score.  She was followed by radiation therapy completed 3/12/2015.  The patient resided in [facility] and took Arimidex for 5 years, completed 1/2020. Dr Linares \par \par She was followed with routine mammograms annually.  Mammogram in [facility] 3/2020- abnormal. Could not get care due to the pandemic. [place] and [place] imaging on 10/7/20. A right breast mass at the 8:00 axis, 7 cm from the nipple was discovered and biopsy was recommended.  The biopsy was done on 10/29/20 and pathology revealed an invasive ductal carcinoma, grade 3 ER/GA negative, HER2 IHC: negative.  \par \par The patient underwent a right mastectomy with SLNB by Dr. Nidhi Wilks from [facility] on  12/1/20.  Surgical pathology showed: Invasive ductal carcinoma   (1.7 cm, microscopic measurement), grade 3. No angiolymphatic or perineural invasion identified. Tumor is 3 cm from posterior margin. Monckaberg's arteriosclerosis.  No lymph nodes were discovered during surgery, therefore none submitted.\par \par Pertinent History:\par PMD: Dr. Cox\par PMH: HTN, DM\par Osteoporosis on Alendronate \par The patient takes gabapentin from hip pain\par FH: Daughter with history of sarcoma of the leg; Another daughter history of uterine cancer.  \par The patient is from [facility], she is a , has three children and lives with her daughter and son-in- law\par \par Grand daughter felt communication was very difficult with [facility] and switched care to [facility]. \par \par Fosamax stopped after 5 years\par Patient is very active, spending most day doing chores. Lately has been getting tired easily. Uses cane due to arthritis pain. Independent in ADL \par  [de-identified] : Ms. ROSEANN LU  is here for a follow up appt for right breast TNBC dx 10/2020, s/p right mastectomy.  1/6/2021 started adjuvant dd CMF chemo q2 weeks\par  S/p CMF #2 Here for C3 today, C2 held 1/20/2021 due to thrombocytopenia, C2 rescheduled for 1/27/2021                             \par Grand daughter Jennifer Davenport  on phone today\par CT scans 12/2020 reviewed. Adrenal thickening is non specific. Will repeat CT after completion of chemo. Bone scan MARIE 12/2020\par Tolerating chemo well. She reports mild-moderate fatigue and altered taste x 3-4 days after chemo. She was able to function, maintained PO intake, weight stable. She had mild nausea, took Reglan, no vomiting, no diarrhea or mouth sores. She had mild bone pain/head aches. No neuropathy, no fevers. No hair loss. 1/20/20210 grade 2 thrombocytopenia  PLT 97,000  denies active bleeding. HOLD CHEMO today reschedule for 1/27/2020. Discussed to change to q 3 weeks as her counts didn’t recover in time for q2w regimen.\par 1/27/2021 Reports mild erythema and pain on palpation to 1/20.2021  IV site on dorsum of right hand. No swelling, streaking or  other s/s of infection noted. IV site pain resolved no discoloration noted. Patient did not receive chemo 1/20/21 so no risk of vein irritation from chemo. . advised to apply warm compress  Counts good .000, grade 1 thrombocytopenia,  Hgb 10.2 We will proceed with CMF C3 today 2/17/2021 and continue CMF with Onpro every 3 weeks going forward. \par Patient received Pfizer COVID vaccine on 1/22/21 and 2/11/21 tolerated vaccine well. \par 2/24/21 Reports dizziness and weakness x 2-3-days  when going from sitting to standing position, encouraged fluids, gatorade,and advised to get up slowly. HR mild tachycardia. will get IV fluids today denies NVD, no body pain,

## 2021-03-11 ENCOUNTER — OUTPATIENT (OUTPATIENT)
Dept: OUTPATIENT SERVICES | Facility: HOSPITAL | Age: 76
LOS: 1 days | Discharge: HOME | End: 2021-03-11

## 2021-03-11 DIAGNOSIS — C50.919 MALIGNANT NEOPLASM OF UNSPECIFIED SITE OF UNSPECIFIED FEMALE BREAST: ICD-10-CM

## 2021-03-11 DIAGNOSIS — Z90.11 ACQUIRED ABSENCE OF RIGHT BREAST AND NIPPLE: Chronic | ICD-10-CM

## 2021-03-11 DIAGNOSIS — Z51.89 ENCOUNTER FOR OTHER SPECIFIED AFTERCARE: ICD-10-CM

## 2021-03-11 NOTE — CONSULT LETTER
[Dear  ___] : Dear  [unfilled], [Consult Letter:] : I had the pleasure of evaluating your patient, [unfilled]. [Please see my note below.] : Please see my note below. [Consult Closing:] : Thank you very much for allowing me to participate in the care of this patient.  If you have any questions, please do not hesitate to contact me. [Sincerely,] : Sincerely, [FreeTextEntry3] : Nayeli Escobedo MD\par Division of Medical Oncology and Hematology\par [facility] [place]\par [employer] at [facility]\par [place], [place]\par \par

## 2021-03-11 NOTE — ASSESSMENT
[FreeTextEntry1] : In summary, Ms. ROSEANN LU is a 75 year old female with history of stage I right breast infiltrating ductal carcinoma, ER/MS positive and HER-2 negative diagnosed in 2015.  Oncotype score was 11.  She underwent breast conservation surgery, radiation and completed anastrozole for 5 years.  Now she is diagnosed with right breast new primary.  She underwent right mastectomy on 12/1/2020 which showed a poorly differentiated 1.7cm invasive ductal carcinoma. T1c Nx Mx Stage IA.  Lymph nodes were not detected due to previous history of sentinel lymph node dissection.  Tumor was triple negative. Patient is elderly, with well-managed comorbidities and overall good performance status.  \par \par - Breast ca: On adjuvant CMF chemo. Pt could not tolerate q2w dose dense regimen therefore switched to q3w. C 4/8 today. SHe developed severe neutropenia after C3 therefore will get chemo with 20% dose reduction today.  \par CBC reviewed with the patient today to make sure she is not neutropenic from high risk cancer therapy drug.  We will continue to monitor CBC every 2 to 4 weeks for intense drug monitoring. Patient is on cytotoxic chemotherapy and needs intensive monitoring for severe toxicity.\par -Chemo induces thrombocytopenia - She has mild thrombocytopenia today. Will give chemo with dose reduction.\par - Chemo induced anemia- Grade 2 Hgb 9.4 today. No transfusion needed. Monitor counts.\par - Chemo induced diarrhea-  Imodium PRN. Maintain PO hydration. BRAT diet\par - Chemo induced N/V- Will get premedications with Emend, dexamethasone and Aloxi. She will continue dexamethasone for next 3 days for antinausea prophylaxis. She will use Reglan as needed. \par - GF induced bone pain- She will take Claritin. \par - Chemo induced dysgeusia and fatigue: Encourage p.o. fluids, small frequent meals. Add IV fluids on D5 of each cycle  \par - Instructed to call office and go directly to the emergency room with fever more than 100.4, shaking chills, productive cough, sore throat, shortness of breath or urinary symptoms. Patient verbalized understanding and agreement.\par - Emotional support provided, all questions answered.\par - CT scans 12/2020 reviewed. Adrenal thickening is non specific. Will repeat CT after completion of chemo. Bone scan MARIE 12/2020\par - She is referred for genetic counselling and testing. \par \par RTO every 3 weeks for CMF with onpro and MD D5 for iV fluids\par \par \par

## 2021-03-11 NOTE — PHYSICAL EXAM
[Restricted in physically strenuous activity but ambulatory and able to carry out work of a light or sedentary nature] : Status 1- Restricted in physically strenuous activity but ambulatory and able to carry out work of a light or sedentary nature, e.g., light house work, office work [Normal] : affect appropriate [de-identified] : Right mastectomy site is healing well.

## 2021-03-11 NOTE — HISTORY OF PRESENT ILLNESS
[T: ___] : T[unfilled] [N: ___] : N[unfilled] [AJCC Stage: ____] : AJCC Stage: [unfilled] [de-identified] : 76 y/o female who presents for initial breast medical oncology consultation.\par \par The patient has a history of right breast IDC, ER/VT+, HER2-, [id number], diagnosed in 2015.  The patient underwent a right lumpectomy with SLNB at [facility] with negative margins, 5/5 negative LNs and low Oncotype score.  She was followed by radiation therapy completed 3/12/2015.  The patient resided in [facility] and took Arimidex for 5 years, completed 1/2020. Dr Linares \par \par She was followed with routine mammograms annually.  Mammogram in [facility] 3/2020- abnormal. Could not get care due to the pandemic. [place] and [place] imaging on 10/7/20. A right breast mass at the 8:00 axis, 7 cm from the nipple was discovered and biopsy was recommended.  The biopsy was done on 10/29/20 and pathology revealed an invasive ductal carcinoma, grade 3 ER/VT negative, HER2 IHC: negative.  \par \par The patient underwent a right mastectomy with SLNB by Dr. Nidhi Wilks from [facility] on  12/1/20.  Surgical pathology showed: Invasive ductal carcinoma   (1.7 cm, microscopic measurement), grade 3. No angiolymphatic or perineural invasion identified. Tumor is 3 cm from posterior margin. Monckaberg's arteriosclerosis.  No lymph nodes were discovered during surgery, therefore none submitted.\par \par Pertinent History:\par PMD: Dr. Cox\par PMH: HTN, DM\par Osteoporosis on Alendronate \par The patient takes gabapentin from hip pain\par FH: Daughter with history of sarcoma of the leg; Another daughter history of uterine cancer.  \par The patient is from [facility], she is a , has three children and lives with her daughter and son-in- law\par \par Grand daughter felt communication was very difficult with [facility] and switched care to [facility]. \par \par Fosamax stopped after 5 years\par Patient is very active, spending most day doing chores. Lately has been getting tired easily. Uses cane due to arthritis pain. Independent in ADL \par  [de-identified] : Ms. ROSEANN LU  is here for a follow up appt for right breast TNBC dx 10/2020, s/p right mastectomy.  1/6/2021 started adjuvant dd CMF chemo q2 weeks\par C2 held 1/20/2021 due to thrombocytopenia, Switched to Q3w treatment for better tolerance and enough time for count recovery.\par She developed severe neutropenia after C3. We discussed to dose reduce by 20%  with C4\par Here for C4 today.\par Grand daughter Bernarda on phone today\par She reports mild-moderate fatigue and altered taste x 3-4 days after chemo. She was able to function, maintained PO intake, weight stable. She had mild nausea, took Reglan, no vomiting, no diarrhea or mouth sores. She had mild bone pain/head aches. No neuropathy, no fevers. No hair loss.\par CT scans 12/2020 reviewed. Adrenal thickening is non specific. Will repeat CT after completion of chemo. Bone scan MARIE 12/2020

## 2021-03-11 NOTE — REASON FOR VISIT
[Follow-Up Visit] : a follow-up [FreeTextEntry2] : Right Breast Cancer [FreeTextEntry3] : Urdu speaking, family member, granddaughter Bernarda on speaker phone translating per pt's request

## 2021-03-16 ENCOUNTER — RESULT REVIEW (OUTPATIENT)
Age: 76
End: 2021-03-16

## 2021-03-16 ENCOUNTER — LABORATORY RESULT (OUTPATIENT)
Age: 76
End: 2021-03-16

## 2021-03-16 ENCOUNTER — APPOINTMENT (OUTPATIENT)
Dept: INFUSION THERAPY | Facility: HOSPITAL | Age: 76
End: 2021-03-16

## 2021-03-16 LAB
BASOPHILS # BLD AUTO: 0.08 K/UL — SIGNIFICANT CHANGE UP (ref 0–0.2)
BASOPHILS NFR BLD AUTO: 1 % — SIGNIFICANT CHANGE UP (ref 0–2)
EOSINOPHIL # BLD AUTO: 0 K/UL — SIGNIFICANT CHANGE UP (ref 0–0.5)
EOSINOPHIL NFR BLD AUTO: 0 % — SIGNIFICANT CHANGE UP (ref 0–6)
HCT VFR BLD CALC: 32.3 % — LOW (ref 34.5–45)
HGB BLD-MCNC: 10.2 G/DL — LOW (ref 11.5–15.5)
LYMPHOCYTES # BLD AUTO: 0.94 K/UL — LOW (ref 1–3.3)
LYMPHOCYTES # BLD AUTO: 12 % — LOW (ref 13–44)
MCHC RBC-ENTMCNC: 28.3 PG — SIGNIFICANT CHANGE UP (ref 27–34)
MCHC RBC-ENTMCNC: 31.6 G/DL — LOW (ref 32–36)
MCV RBC AUTO: 89.5 FL — SIGNIFICANT CHANGE UP (ref 80–100)
MONOCYTES # BLD AUTO: 0.79 K/UL — SIGNIFICANT CHANGE UP (ref 0–0.9)
MONOCYTES NFR BLD AUTO: 10 % — SIGNIFICANT CHANGE UP (ref 2–14)
NEUTROPHILS # BLD AUTO: 6.05 K/UL — SIGNIFICANT CHANGE UP (ref 1.8–7.4)
NEUTROPHILS NFR BLD AUTO: 77 % — SIGNIFICANT CHANGE UP (ref 43–77)
NRBC # BLD: 0 /100 — SIGNIFICANT CHANGE UP (ref 0–0)
NRBC # BLD: SIGNIFICANT CHANGE UP /100 WBCS (ref 0–0)
PLAT MORPH BLD: NORMAL — SIGNIFICANT CHANGE UP
PLATELET # BLD AUTO: 97 K/UL — LOW (ref 150–400)
RBC # BLD: 3.61 M/UL — LOW (ref 3.8–5.2)
RBC # FLD: 18.8 % — HIGH (ref 10.3–14.5)
RBC BLD AUTO: SIGNIFICANT CHANGE UP
WBC # BLD: 7.86 K/UL — SIGNIFICANT CHANGE UP (ref 3.8–10.5)
WBC # FLD AUTO: 7.86 K/UL — SIGNIFICANT CHANGE UP (ref 3.8–10.5)

## 2021-03-17 DIAGNOSIS — E86.0 DEHYDRATION: ICD-10-CM

## 2021-03-18 ENCOUNTER — NON-APPOINTMENT (OUTPATIENT)
Age: 76
End: 2021-03-18

## 2021-03-22 ENCOUNTER — NON-APPOINTMENT (OUTPATIENT)
Age: 76
End: 2021-03-22

## 2021-03-26 ENCOUNTER — APPOINTMENT (OUTPATIENT)
Dept: HEMATOLOGY ONCOLOGY | Facility: CLINIC | Age: 76
End: 2021-03-26

## 2021-03-26 ENCOUNTER — LABORATORY RESULT (OUTPATIENT)
Age: 76
End: 2021-03-26

## 2021-03-31 ENCOUNTER — APPOINTMENT (OUTPATIENT)
Dept: INFUSION THERAPY | Facility: HOSPITAL | Age: 76
End: 2021-03-31

## 2021-03-31 ENCOUNTER — LABORATORY RESULT (OUTPATIENT)
Age: 76
End: 2021-03-31

## 2021-03-31 ENCOUNTER — RESULT REVIEW (OUTPATIENT)
Age: 76
End: 2021-03-31

## 2021-03-31 ENCOUNTER — APPOINTMENT (OUTPATIENT)
Dept: HEMATOLOGY ONCOLOGY | Facility: CLINIC | Age: 76
End: 2021-03-31
Payer: MEDICAID

## 2021-03-31 VITALS
DIASTOLIC BLOOD PRESSURE: 68 MMHG | HEART RATE: 89 BPM | TEMPERATURE: 97.5 F | WEIGHT: 138.01 LBS | HEIGHT: 59.06 IN | BODY MASS INDEX: 27.82 KG/M2 | OXYGEN SATURATION: 97 % | RESPIRATION RATE: 16 BRPM | SYSTOLIC BLOOD PRESSURE: 105 MMHG

## 2021-03-31 LAB
BASOPHILS # BLD AUTO: 0.03 K/UL — SIGNIFICANT CHANGE UP (ref 0–0.2)
BASOPHILS NFR BLD AUTO: 0.5 % — SIGNIFICANT CHANGE UP (ref 0–2)
EOSINOPHIL # BLD AUTO: 0.09 K/UL — SIGNIFICANT CHANGE UP (ref 0–0.5)
EOSINOPHIL NFR BLD AUTO: 1.4 % — SIGNIFICANT CHANGE UP (ref 0–6)
HCT VFR BLD CALC: 31.1 % — LOW (ref 34.5–45)
HGB BLD-MCNC: 9.8 G/DL — LOW (ref 11.5–15.5)
IMM GRANULOCYTES NFR BLD AUTO: 0.9 % — SIGNIFICANT CHANGE UP (ref 0–1.5)
LYMPHOCYTES # BLD AUTO: 1.79 K/UL — SIGNIFICANT CHANGE UP (ref 1–3.3)
LYMPHOCYTES # BLD AUTO: 27 % — SIGNIFICANT CHANGE UP (ref 13–44)
MCHC RBC-ENTMCNC: 28.4 PG — SIGNIFICANT CHANGE UP (ref 27–34)
MCHC RBC-ENTMCNC: 31.5 G/DL — LOW (ref 32–36)
MCV RBC AUTO: 90.1 FL — SIGNIFICANT CHANGE UP (ref 80–100)
MONOCYTES # BLD AUTO: 0.53 K/UL — SIGNIFICANT CHANGE UP (ref 0–0.9)
MONOCYTES NFR BLD AUTO: 8 % — SIGNIFICANT CHANGE UP (ref 2–14)
NEUTROPHILS # BLD AUTO: 4.12 K/UL — SIGNIFICANT CHANGE UP (ref 1.8–7.4)
NEUTROPHILS NFR BLD AUTO: 62.2 % — SIGNIFICANT CHANGE UP (ref 43–77)
NRBC # BLD: 0 /100 WBCS — SIGNIFICANT CHANGE UP (ref 0–0)
PLATELET # BLD AUTO: 133 K/UL — LOW (ref 150–400)
RBC # BLD: 3.45 M/UL — LOW (ref 3.8–5.2)
RBC # FLD: 18.8 % — HIGH (ref 10.3–14.5)
WBC # BLD: 6.62 K/UL — SIGNIFICANT CHANGE UP (ref 3.8–10.5)
WBC # FLD AUTO: 6.62 K/UL — SIGNIFICANT CHANGE UP (ref 3.8–10.5)

## 2021-03-31 PROCEDURE — 99215 OFFICE O/P EST HI 40 MIN: CPT

## 2021-03-31 PROCEDURE — 99072 ADDL SUPL MATRL&STAF TM PHE: CPT

## 2021-03-31 RX ORDER — LORATADINE 10 MG/1
10 CAPSULE, LIQUID FILLED ORAL DAILY
Qty: 30 | Refills: 0 | Status: ACTIVE | COMMUNITY
Start: 2021-01-06 | End: 1900-01-01

## 2021-03-31 RX ORDER — OMEPRAZOLE 40 MG/1
40 CAPSULE, DELAYED RELEASE ORAL
Qty: 30 | Refills: 2 | Status: ACTIVE | COMMUNITY
Start: 2021-01-06 | End: 1900-01-01

## 2021-03-31 NOTE — HISTORY OF PRESENT ILLNESS
[T: ___] : T[unfilled] [N: ___] : N[unfilled] [AJCC Stage: ____] : AJCC Stage: [unfilled] [de-identified] : 76 y/o female who presents for initial breast medical oncology consultation.\par \par The patient has a history of right breast IDC, ER/WA+, HER2-, [id number], diagnosed in 2015.  The patient underwent a right lumpectomy with SLNB at [facility] with negative margins, 5/5 negative LNs and low Oncotype score.  She was followed by radiation therapy completed 3/12/2015.  The patient resided in [facility] and took Arimidex for 5 years, completed 1/2020. Dr Linares \par \par She was followed with routine mammograms annually.  Mammogram in [facility] 3/2020- abnormal. Could not get care due to the pandemic. [place] and [place] imaging on 10/7/20. A right breast mass at the 8:00 axis, 7 cm from the nipple was discovered and biopsy was recommended.  The biopsy was done on 10/29/20 and pathology revealed an invasive ductal carcinoma, grade 3 ER/WA negative, HER2 IHC: negative.  \par \par The patient underwent a right mastectomy with SLNB by Dr. Nidhi Wilks from [facility] on  12/1/20.  Surgical pathology showed: Invasive ductal carcinoma   (1.7 cm, microscopic measurement), grade 3. No angiolymphatic or perineural invasion identified. Tumor is 3 cm from posterior margin. Monckaberg's arteriosclerosis.  No lymph nodes were discovered during surgery, therefore none submitted.\par \par Pertinent History:\par PMD: Dr. Cox\par PMH: HTN, DM\par Osteoporosis on Alendronate \par The patient takes gabapentin from hip pain\par FH: Daughter with history of sarcoma of the leg; Another daughter history of uterine cancer.  \par The patient is from [facility], she is a , has three children and lives with her daughter and son-in- law\par \par Grand daughter felt communication was very difficult with [facility] and switched care to [facility]. \par \par Fosamax stopped after 5 years\par Patient is very active, spending most day doing chores. Lately has been getting tired easily. Uses cane due to arthritis pain. Independent in ADL \par  [de-identified] : Ms. ROSEANN LU  is here for a follow up appt for right breast TNBC dx 10/2020, s/p right mastectomy. 1/6/2021 started adjuvant dd CMF chemo q2 weeks\par C2 held 1/20/2021 due to thrombocytopenia, Switched to Q3w treatment for better tolerance and enough time for count recovery.\par She developed severe neutropenia after C3. We discussed to dose reduce by 20%  with C4\par Here for C5 today 3/31/21. Counts good today Hgb 9.8, ANC 4.12, \par Grand daughter Laura on phone today\par She reports mild-moderate FATIGUE D2 and D3 after chemo and altered taste x 3-4 days after chemo. She was able to function, maintained PO intake, weight stable. She had mild nausea, took Reglan, no vomiting, no diarrhea or mouth sores. She had mild bone pain/head aches. No neuropathy, no fevers. No hair loss.\par CT scans 12/2020 reviewed. Adrenal thickening is non specific. Will repeat CT after completion of chemo. Bone scan MARIE 12/2020\par Patient received both doses of Pfizer COVID-19 1/22/21 and 2/11/21 Tolerated well. Patient was asking if C7 and C8 necessary. I advised if she has tolerable side effects it is favorable to complete 8 cycles of CMF given the fact that she has TNBC .\par \par

## 2021-03-31 NOTE — ASSESSMENT
[FreeTextEntry1] : In summary, Ms. ROSEANN LU is a 75 year old female with history of stage I right breast infiltrating ductal carcinoma, ER/AK positive and HER-2 negative diagnosed in 2015.  Oncotype score was 11.  She underwent breast conservation surgery, radiation and completed anastrozole for 5 years.  Now she is diagnosed with right breast new primary.  She underwent right mastectomy on 12/1/2020 which showed a poorly differentiated 1.7cm invasive ductal carcinoma. T1c Nx Mx Stage IA.  Lymph nodes were not detected due to previous history of sentinel lymph node dissection.  Tumor was triple negative. Patient is elderly, with well-managed comorbidities and overall good performance status.  \par \par - Breast ca: On adjuvant CMF chemo. Pt could not tolerate q2w dose dense regimen therefore switched to q3w. C 5/8 today. SHe developed severe neutropenia after C3 therefore will get chemo with 20% dose reduction today.  \par CBC reviewed with the patient today to make sure she is not neutropenic from high risk cancer therapy drug.  We will continue to monitor CBC every 2 to 4 weeks for intense drug monitoring. Patient is on cytotoxic chemotherapy and needs intensive monitoring for severe toxicity.  PATIENT WAS ASKING if C7 and C8 NECESSARY. I advised if she has tolerable side effects it is favorable to complete 8 cycles of CMF if possible given the fact that she has TNBC. She will dw Dr. Escobedo next visit\par \par -Chemo induces thrombocytopenia - She has mild thrombocytopenia today. Will give chemo with dose reduction. PLTS 133 today 3/31/21 will proceed with C5 today\par - Chemo induced anemia- Grade 2 Hgb 9.8 today. No transfusion needed. Monitor counts.3/21/21 Counts good \par - Chemo induced diarrhea-  Imodium PRN. Maintain PO hydration. BRAT diet\par - Chemo induced N/V- Will get premedications with Emend, dexamethasone and Aloxi. She will continue dexamethasone for next 3 days for antinausea prophylaxis. She will use Reglan as needed. \par - GF induced bone pain- She will take Claritin. \par - Chemo induced dysgeusia and fatigue: Encourage p.o. fluids, small frequent meals. Add IV fluids on D5 of each cycle  \par - Instructed to call office and go directly to the emergency room with fever more than 100.4, shaking chills, productive cough, sore throat, shortness of breath or urinary symptoms. Patient verbalized understanding and agreement.\par - Emotional support provided, all questions answered.\par - CT scans 12/2020 reviewed. Adrenal thickening is non specific. Will repeat CT after completion of chemo. Bone scan MARIE 12/2020\par - She is referred for genetic counselling and testing.She went for testing 1/2021\par \par RTO every 3 weeks for CMF with onpro and MD D6 for iV fluids\par IVF 4/6/2021\par C6 4/21/21\par \par

## 2021-03-31 NOTE — REASON FOR VISIT
[Follow-Up Visit] : a follow-up [Other: _____] : [unfilled] [FreeTextEntry2] : Right Breast Cancer [FreeTextEntry3] : Danish speaking, family member, granddaughter Laura on speaker phone translating per pt's request

## 2021-04-01 DIAGNOSIS — Z51.11 ENCOUNTER FOR ANTINEOPLASTIC CHEMOTHERAPY: ICD-10-CM

## 2021-04-01 DIAGNOSIS — R11.2 NAUSEA WITH VOMITING, UNSPECIFIED: ICD-10-CM

## 2021-04-01 DIAGNOSIS — Z51.89 ENCOUNTER FOR OTHER SPECIFIED AFTERCARE: ICD-10-CM

## 2021-04-05 ENCOUNTER — NON-APPOINTMENT (OUTPATIENT)
Age: 76
End: 2021-04-05

## 2021-04-06 ENCOUNTER — RESULT REVIEW (OUTPATIENT)
Age: 76
End: 2021-04-06

## 2021-04-06 ENCOUNTER — LABORATORY RESULT (OUTPATIENT)
Age: 76
End: 2021-04-06

## 2021-04-06 ENCOUNTER — APPOINTMENT (OUTPATIENT)
Dept: INFUSION THERAPY | Facility: HOSPITAL | Age: 76
End: 2021-04-06

## 2021-04-06 LAB
BASOPHILS # BLD AUTO: 0 K/UL — SIGNIFICANT CHANGE UP (ref 0–0.2)
BASOPHILS NFR BLD AUTO: 0 % — SIGNIFICANT CHANGE UP (ref 0–2)
EOSINOPHIL # BLD AUTO: 0.22 K/UL — SIGNIFICANT CHANGE UP (ref 0–0.5)
EOSINOPHIL NFR BLD AUTO: 2 % — SIGNIFICANT CHANGE UP (ref 0–6)
HCT VFR BLD CALC: 31.8 % — LOW (ref 34.5–45)
HGB BLD-MCNC: 9.9 G/DL — LOW (ref 11.5–15.5)
LYMPHOCYTES # BLD AUTO: 1.84 K/UL — SIGNIFICANT CHANGE UP (ref 1–3.3)
LYMPHOCYTES # BLD AUTO: 17 % — SIGNIFICANT CHANGE UP (ref 13–44)
MCHC RBC-ENTMCNC: 28.7 PG — SIGNIFICANT CHANGE UP (ref 27–34)
MCHC RBC-ENTMCNC: 31.1 G/DL — LOW (ref 32–36)
MCV RBC AUTO: 92.2 FL — SIGNIFICANT CHANGE UP (ref 80–100)
MONOCYTES # BLD AUTO: 1.08 K/UL — HIGH (ref 0–0.9)
MONOCYTES NFR BLD AUTO: 10 % — SIGNIFICANT CHANGE UP (ref 2–14)
NEUTROPHILS # BLD AUTO: 7.67 K/UL — HIGH (ref 1.8–7.4)
NEUTROPHILS NFR BLD AUTO: 71 % — SIGNIFICANT CHANGE UP (ref 43–77)
NRBC # BLD: 0 /100 — SIGNIFICANT CHANGE UP (ref 0–0)
NRBC # BLD: SIGNIFICANT CHANGE UP /100 WBCS (ref 0–0)
PLAT MORPH BLD: NORMAL — SIGNIFICANT CHANGE UP
PLATELET # BLD AUTO: 101 K/UL — LOW (ref 150–400)
RBC # BLD: 3.45 M/UL — LOW (ref 3.8–5.2)
RBC # FLD: 18.4 % — HIGH (ref 10.3–14.5)
RBC BLD AUTO: SIGNIFICANT CHANGE UP
WBC # BLD: 10.8 K/UL — HIGH (ref 3.8–10.5)
WBC # FLD AUTO: 10.8 K/UL — HIGH (ref 3.8–10.5)

## 2021-04-12 ENCOUNTER — APPOINTMENT (OUTPATIENT)
Dept: ENDOCRINOLOGY | Facility: CLINIC | Age: 76
End: 2021-04-12

## 2021-04-19 ENCOUNTER — OUTPATIENT (OUTPATIENT)
Dept: OUTPATIENT SERVICES | Facility: HOSPITAL | Age: 76
LOS: 1 days | Discharge: HOME | End: 2021-04-19

## 2021-04-19 DIAGNOSIS — Z90.11 ACQUIRED ABSENCE OF RIGHT BREAST AND NIPPLE: Chronic | ICD-10-CM

## 2021-04-19 DIAGNOSIS — C50.919 MALIGNANT NEOPLASM OF UNSPECIFIED SITE OF UNSPECIFIED FEMALE BREAST: ICD-10-CM

## 2021-04-21 ENCOUNTER — LABORATORY RESULT (OUTPATIENT)
Age: 76
End: 2021-04-21

## 2021-04-21 ENCOUNTER — APPOINTMENT (OUTPATIENT)
Dept: INFUSION THERAPY | Facility: HOSPITAL | Age: 76
End: 2021-04-21

## 2021-04-21 ENCOUNTER — APPOINTMENT (OUTPATIENT)
Dept: HEMATOLOGY ONCOLOGY | Facility: CLINIC | Age: 76
End: 2021-04-21
Payer: MEDICAID

## 2021-04-21 ENCOUNTER — RESULT REVIEW (OUTPATIENT)
Age: 76
End: 2021-04-21

## 2021-04-21 VITALS
HEART RATE: 86 BPM | TEMPERATURE: 97.2 F | BODY MASS INDEX: 28 KG/M2 | RESPIRATION RATE: 16 BRPM | SYSTOLIC BLOOD PRESSURE: 123 MMHG | HEIGHT: 59.06 IN | DIASTOLIC BLOOD PRESSURE: 70 MMHG | OXYGEN SATURATION: 97 % | WEIGHT: 138.89 LBS

## 2021-04-21 LAB
BASOPHILS # BLD AUTO: 0.03 K/UL — SIGNIFICANT CHANGE UP (ref 0–0.2)
BASOPHILS NFR BLD AUTO: 0.4 % — SIGNIFICANT CHANGE UP (ref 0–2)
EOSINOPHIL # BLD AUTO: 0.14 K/UL — SIGNIFICANT CHANGE UP (ref 0–0.5)
EOSINOPHIL NFR BLD AUTO: 2 % — SIGNIFICANT CHANGE UP (ref 0–6)
HCT VFR BLD CALC: 31.4 % — LOW (ref 34.5–45)
HGB BLD-MCNC: 10 G/DL — LOW (ref 11.5–15.5)
IMM GRANULOCYTES NFR BLD AUTO: 0.4 % — SIGNIFICANT CHANGE UP (ref 0–1.5)
LYMPHOCYTES # BLD AUTO: 2 K/UL — SIGNIFICANT CHANGE UP (ref 1–3.3)
LYMPHOCYTES # BLD AUTO: 28.5 % — SIGNIFICANT CHANGE UP (ref 13–44)
MCHC RBC-ENTMCNC: 29.1 PG — SIGNIFICANT CHANGE UP (ref 27–34)
MCHC RBC-ENTMCNC: 31.8 G/DL — LOW (ref 32–36)
MCV RBC AUTO: 91.3 FL — SIGNIFICANT CHANGE UP (ref 80–100)
MONOCYTES # BLD AUTO: 0.54 K/UL — SIGNIFICANT CHANGE UP (ref 0–0.9)
MONOCYTES NFR BLD AUTO: 7.7 % — SIGNIFICANT CHANGE UP (ref 2–14)
NEUTROPHILS # BLD AUTO: 4.28 K/UL — SIGNIFICANT CHANGE UP (ref 1.8–7.4)
NEUTROPHILS NFR BLD AUTO: 61 % — SIGNIFICANT CHANGE UP (ref 43–77)
NRBC # BLD: 0 /100 WBCS — SIGNIFICANT CHANGE UP (ref 0–0)
PLATELET # BLD AUTO: 128 K/UL — LOW (ref 150–400)
RBC # BLD: 3.44 M/UL — LOW (ref 3.8–5.2)
RBC # FLD: 17.2 % — HIGH (ref 10.3–14.5)
WBC # BLD: 7.02 K/UL — SIGNIFICANT CHANGE UP (ref 3.8–10.5)
WBC # FLD AUTO: 7.02 K/UL — SIGNIFICANT CHANGE UP (ref 3.8–10.5)

## 2021-04-21 PROCEDURE — 99215 OFFICE O/P EST HI 40 MIN: CPT

## 2021-04-21 PROCEDURE — 99072 ADDL SUPL MATRL&STAF TM PHE: CPT

## 2021-04-21 NOTE — ASSESSMENT
[FreeTextEntry1] : In summary, Ms. ROSEANN LU is a 75 year old female with history of stage I right breast infiltrating ductal carcinoma, ER/IN positive and HER-2 negative diagnosed in 2015.  Oncotype score was 11.  She underwent breast conservation surgery, radiation and completed anastrozole for 5 years.  Now she is diagnosed with right breast new primary.  She underwent right mastectomy on 12/1/2020 which showed a poorly differentiated 1.7cm invasive ductal carcinoma. T1c Nx Mx Stage IA.  Lymph nodes were not detected due to previous history of sentinel lymph node dissection.  Tumor was triple negative. Patient is elderly, with well-managed comorbidities and overall good performance status.  \par \par - Breast ca: On adjuvant CMF chemo. Pt could not tolerate q2w dose dense regimen therefore switched to q3w. C 5/8 today. SHe developed severe neutropenia after C3 therefore will get chemo with 20% dose reduction today.  \par CBC reviewed with the patient today to make sure she is not neutropenic from high risk cancer therapy drug.  We will continue to monitor CBC every 2 to 4 weeks for intense drug monitoring. Patient is on cytotoxic chemotherapy and needs intensive monitoring for severe toxicity. \par - POOR IV ACCESS: She was fasting last week for Ramadan but not today. She drank a lot of water. Chemo RN wasn’t able to get an IV line in after trying x 3. Patient reports she is tolerating chemo well since dose reduction and switching to q3w. I discussed that we can put in PICC line for IV access and finish last 3 treatments ( to complete 8 cycles). She was in agreement. \par HOLD CHEMO TODAY. PICC LINE ASAP. CHEMO NEXT WEEK\par -Chemo induces thrombocytopenia - She has mild thrombocytopenia 2/2 CHEMO. Will give chemo with dose reduction. \par - Chemo induced anemia- Grade 2. No transfusion needed. Monitor counts.\par - Chemo induced diarrhea-  Imodium PRN. Maintain PO hydration. BRAT diet\par - Chemo induced N/V- Will get premedications with Emend, dexamethasone and Aloxi. She will continue dexamethasone for next 3 days for antinausea prophylaxis. She will use Reglan as needed. \par - GF induced bone pain- She will take Claritin. \par - Chemo induced dysgeusia and fatigue: Encourage p.o. fluids, small frequent meals. Add IV fluids on D5 of each cycle  \par - Instructed to call office and go directly to the emergency room with fever more than 100.4, shaking chills, productive cough, sore throat, shortness of breath or urinary symptoms. Patient verbalized understanding and agreement.\par - Emotional support provided, all questions answered.\par - CT scans 12/2020 reviewed. Adrenal thickening is non specific. Will repeat CT after completion of chemo. Bone scan MARIE 12/2020\par - She is referred for genetic counselling and testing.She went for testing 1/2021\par \par RTO every 3 weeks for CMF with onpro and MD \par D6 for iV fluids\par Emailed schedulers to expedite PICC and hemanth chemo/iv fluid appts

## 2021-04-21 NOTE — HISTORY OF PRESENT ILLNESS
[T: ___] : T[unfilled] [N: ___] : N[unfilled] [AJCC Stage: ____] : AJCC Stage: [unfilled] [de-identified] : 74 y/o female who presents for initial breast medical oncology consultation.\par \par The patient has a history of right breast IDC, ER/WI+, HER2-, [id number], diagnosed in 2015.  The patient underwent a right lumpectomy with SLNB at [facility] with negative margins, 5/5 negative LNs and low Oncotype score.  She was followed by radiation therapy completed 3/12/2015.  The patient resided in [facility] and took Arimidex for 5 years, completed 1/2020. Dr Linares \par \par She was followed with routine mammograms annually.  Mammogram in [facility] 3/2020- abnormal. Could not get care due to the pandemic. [place] and [place] imaging on 10/7/20. A right breast mass at the 8:00 axis, 7 cm from the nipple was discovered and biopsy was recommended.  The biopsy was done on 10/29/20 and pathology revealed an invasive ductal carcinoma, grade 3 ER/WI negative, HER2 IHC: negative.  \par \par The patient underwent a right mastectomy with SLNB by Dr. Nidhi Wilks from [facility] on  12/1/20.  Surgical pathology showed: Invasive ductal carcinoma   (1.7 cm, microscopic measurement), grade 3. No angiolymphatic or perineural invasion identified. Tumor is 3 cm from posterior margin. Monckaberg's arteriosclerosis.  No lymph nodes were discovered during surgery, therefore none submitted.\par \par Pertinent History:\par PMD: Dr. Cox\par PMH: HTN, DM\par Osteoporosis on Alendronate \par The patient takes gabapentin from hip pain\par FH: Daughter with history of sarcoma of the leg; Another daughter history of uterine cancer.  \par The patient is from [facility], she is a , has three children and lives with her daughter and son-in- law\par \par Grand daughter felt communication was very difficult with [facility] and switched care to [facility]. \par \par Fosamax stopped after 5 years\par Patient is very active, spending most day doing chores. Lately has been getting tired easily. Uses cane due to arthritis pain. Independent in ADL \par  [de-identified] : Ms. ROSEANN LU  is here for a follow up appt for right breast TNBC dx 10/2020, s/p right mastectomy. 1/6/2021 started adjuvant dd CMF chemo q2 weeks\par C2 held 1/20/2021 due to thrombocytopenia, Switched to Q3w treatment for better tolerance and enough time for count recovery.\par She developed severe neutropenia after C3. We discussed to dose reduce by 20%  with C4\par Here for C6 today 4/21/21.\par She was fasting last week for Ramadan but not today. She drank a lot of water. Chemo RN wasn’t able to get an IV line in after trying x 3. Patient reports she is tolerating chemo well since dose reduction and switching to q3w. I discussed that we can put in PICC line for IV access and finish last 3 treatments ( to complete 8 cycles). She was in agreement. \par Grand daughter Bernarda on phone today\par She reports mild-moderate FATIGUE D2 and D3 after chemo and altered taste x 3-4 days after chemo. She was able to function, maintained PO intake, weight stable. She had mild nausea, took Reglan, no vomiting, no diarrhea or mouth sores. She had mild bone pain/head aches. No neuropathy, no fevers. No hair loss.\par CT scans 12/2020 reviewed. Adrenal thickening is non specific. Will repeat CT after completion of chemo. Bone scan MARIE 12/2020\par Patient received both doses of covid vaccine

## 2021-04-21 NOTE — PHYSICAL EXAM
[Restricted in physically strenuous activity but ambulatory and able to carry out work of a light or sedentary nature] : Status 1- Restricted in physically strenuous activity but ambulatory and able to carry out work of a light or sedentary nature, e.g., light house work, office work [Normal] : affect appropriate [de-identified] : Right mastectomy site is healing well with hyperpigmentation and dry skin noted along suture line.no s/s of infection noted

## 2021-04-21 NOTE — REASON FOR VISIT
[Follow-Up Visit] : a follow-up [Other: _____] : [unfilled] [FreeTextEntry2] : Right Breast Cancer [FreeTextEntry3] : Azeri speaking, family member, granddaughter Laura on speaker phone translating per pt's request

## 2021-04-23 ENCOUNTER — OUTPATIENT (OUTPATIENT)
Dept: OUTPATIENT SERVICES | Facility: HOSPITAL | Age: 76
LOS: 1 days | End: 2021-04-23
Payer: MEDICAID

## 2021-04-23 DIAGNOSIS — Z90.11 ACQUIRED ABSENCE OF RIGHT BREAST AND NIPPLE: Chronic | ICD-10-CM

## 2021-04-23 DIAGNOSIS — Z11.52 ENCOUNTER FOR SCREENING FOR COVID-19: ICD-10-CM

## 2021-04-23 LAB — SARS-COV-2 RNA SPEC QL NAA+PROBE: SIGNIFICANT CHANGE UP

## 2021-04-23 PROCEDURE — C9803: CPT

## 2021-04-23 PROCEDURE — U0005: CPT

## 2021-04-23 PROCEDURE — U0003: CPT

## 2021-04-26 ENCOUNTER — OUTPATIENT (OUTPATIENT)
Dept: OUTPATIENT SERVICES | Facility: HOSPITAL | Age: 76
LOS: 1 days | End: 2021-04-26
Payer: MEDICAID

## 2021-04-26 ENCOUNTER — RESULT REVIEW (OUTPATIENT)
Age: 76
End: 2021-04-26

## 2021-04-26 VITALS
RESPIRATION RATE: 14 BRPM | DIASTOLIC BLOOD PRESSURE: 74 MMHG | HEART RATE: 90 BPM | SYSTOLIC BLOOD PRESSURE: 151 MMHG | OXYGEN SATURATION: 99 % | TEMPERATURE: 98 F

## 2021-04-26 DIAGNOSIS — C50.919 MALIGNANT NEOPLASM OF UNSPECIFIED SITE OF UNSPECIFIED FEMALE BREAST: ICD-10-CM

## 2021-04-26 DIAGNOSIS — Z90.11 ACQUIRED ABSENCE OF RIGHT BREAST AND NIPPLE: Chronic | ICD-10-CM

## 2021-04-26 PROCEDURE — C1751: CPT

## 2021-04-26 PROCEDURE — 36573 INSJ PICC RS&I 5 YR+: CPT

## 2021-04-26 RX ORDER — BLOOD SUGAR DIAGNOSTIC
STRIP MISCELLANEOUS 3 TIMES DAILY
Qty: 270 | Refills: 2 | Status: ACTIVE | COMMUNITY
Start: 2021-02-23 | End: 1900-01-01

## 2021-04-26 NOTE — PROCEDURE NOTE - ESTIMATED BLOOD LOSS
Pt mobilized to chair.  Pt oxygen sats in the mid 80s.  This RN asked pt where her saturations are at home and pt states that she is normally in the mid 80s to low 90s.  Pt states when she felt sick she goes down into the 70s.   Minimal

## 2021-04-26 NOTE — PRE PROCEDURE NOTE - PRE PROCEDURE EVALUATION
Interventional Radiology    HPI: 76 year old female with right side breast cancer on chemotherapy requiring venous access presents to IR for PICC placement.    Allergies: NDKA      Data:  T(C): 36.8  HR: 90  BP: 151/74  RR: 14  SpO2: 99%    Exam  General: No acute distress  Chest: Non labored breathing  Abdomen: Non-distended  Extremities: No swelling, warm        Plan: 76y Female presents for PICC placement.  -Risks/Benefits/alternatives explained with the patient in native language utilizing  phone and witnessed informed consent obtained.

## 2021-04-26 NOTE — PROCEDURE NOTE - NSPROCDETAILS_GEN_ALL_CORE
location identified, draped/prepped, sterile technique used/sterile dressing applied/sterile technique, catheter placed/supine position

## 2021-04-26 NOTE — ASU PREOP CHECKLIST - AICD PRESENT
no Detail Level: Zone Plan: Pt will take doxycycline, tretinoin, and benzoyl peroxide to help with acne Initiate Treatment: Doxy 100mg, tretinoin 0.1%, benzoyl peroxide 5%

## 2021-04-26 NOTE — ASU DISCHARGE PLAN (ADULT/PEDIATRIC) - ASU DC SPECIAL INSTRUCTIONSFT
PICC Placement    Discharge Instructions  - You have had a PICC implated in your arm.   - The PICC is ready for use.  - You may shower as long as the PICC and dressing remains dry.  -  No soaking or swimming until the PICC is removed or when the site is completely healed.  - Keep the area covered and dry for as long as the PICC remains in. It may be removed and changed by a nurse as needed.  - Do not perform any heavy lifting or put tension on the area for the next week or until the site is healed.  - You may resume your normal diet.  - You may resume your normal medications however you should wait 48 hours before restarting aspirin, plavix, or blood thinners.  - It is normal to experience some pain over the site for the next few days. You may take apply ice to the area (20 minutes on, 20 minutes off) and take Tylenol for that pain. Do not take more frequently than every 6 hours and do not exceed more than 3000mg of Tylenol in a 24 hour period.    Notify your primary physician and/or Interventional Radiology IMMEDIATELY if you experience any of the following       - Fever of 100.4F or 38C       - Chills or Rigors/ Shakes       - Swelling and/or Redness in the area around the port       - Worsening Pain       - Blood soaked bandages or worsening bleeding       - Lightheadedness and/or dizziness upon standing       - Chest Pain/ Tightness       - Shortness of Breath       - Difficulty walking    If you have a problem that you believe requires IMMEDIATE attention, please go to your NEAREST Emergency Room. If you believe your problem can safely wait until you speak to a physician, please call Interventional Radiology for any concerns.    During Normal Weekday Business Hours- You can contact the Interventional Radiology department during normal business hours via telephone.  During Evenings and Weekends- If you need to contact Interventional Radiology during off hours, do so by calling the hospital and requesting to be connected to the Interventional Radiologist on call. PICC Placement    Discharge Instructions  - You have had a PICC implanted in your arm.   - The PICC is ready for use.  - You may shower as long as the PICC and dressing remains dry.  -  No soaking or swimming until the PICC is removed or when the site is completely healed.  - Keep the area covered and dry for as long as the PICC remains in. It may be removed and changed by a nurse as needed.  - Do not perform any heavy lifting or put tension on the area for the next week or until the site is healed.  - You may resume your normal diet.  - You may resume your normal medications however you should wait 48 hours before restarting aspirin, plavix, or blood thinners.  - It is normal to experience some pain over the site for the next few days. You may take apply ice to the area (20 minutes on, 20 minutes off) and take Tylenol for that pain. Do not take more frequently than every 6 hours and do not exceed more than 3000mg of Tylenol in a 24 hour period.    Notify your primary physician and/or Interventional Radiology IMMEDIATELY if you experience any of the following       - Fever of 100.4F or 38C       - Chills or Rigors/ Shakes       - Swelling and/or Redness in the area around the port       - Worsening Pain       - Blood soaked bandages or worsening bleeding       - Lightheadedness and/or dizziness upon standing       - Chest Pain/ Tightness       - Shortness of Breath       - Difficulty walking    If you have a problem that you believe requires IMMEDIATE attention, please go to your NEAREST Emergency Room. If you believe your problem can safely wait until you speak to a physician, please call Interventional Radiology for any concerns.    During Normal Weekday Business Hours- You can contact the Interventional Radiology department during normal business hours via telephone.  During Evenings and Weekends- If you need to contact Interventional Radiology during off hours, do so by calling the hospital and requesting to be connected to the Interventional Radiologist on call.

## 2021-04-27 DIAGNOSIS — Z51.11 ENCOUNTER FOR ANTINEOPLASTIC CHEMOTHERAPY: ICD-10-CM

## 2021-04-27 DIAGNOSIS — R11.2 NAUSEA WITH VOMITING, UNSPECIFIED: ICD-10-CM

## 2021-04-28 ENCOUNTER — APPOINTMENT (OUTPATIENT)
Dept: INFUSION THERAPY | Facility: HOSPITAL | Age: 76
End: 2021-04-28

## 2021-04-28 ENCOUNTER — RESULT REVIEW (OUTPATIENT)
Age: 76
End: 2021-04-28

## 2021-04-28 ENCOUNTER — LABORATORY RESULT (OUTPATIENT)
Age: 76
End: 2021-04-28

## 2021-04-28 LAB
BASOPHILS # BLD AUTO: 0.04 K/UL — SIGNIFICANT CHANGE UP (ref 0–0.2)
BASOPHILS NFR BLD AUTO: 0.5 % — SIGNIFICANT CHANGE UP (ref 0–2)
EOSINOPHIL # BLD AUTO: 0.13 K/UL — SIGNIFICANT CHANGE UP (ref 0–0.5)
EOSINOPHIL NFR BLD AUTO: 1.6 % — SIGNIFICANT CHANGE UP (ref 0–6)
HCT VFR BLD CALC: 32.5 % — LOW (ref 34.5–45)
HGB BLD-MCNC: 10.3 G/DL — LOW (ref 11.5–15.5)
IMM GRANULOCYTES NFR BLD AUTO: 0.7 % — SIGNIFICANT CHANGE UP (ref 0–1.5)
LYMPHOCYTES # BLD AUTO: 2.29 K/UL — SIGNIFICANT CHANGE UP (ref 1–3.3)
LYMPHOCYTES # BLD AUTO: 27.8 % — SIGNIFICANT CHANGE UP (ref 13–44)
MCHC RBC-ENTMCNC: 29.1 PG — SIGNIFICANT CHANGE UP (ref 27–34)
MCHC RBC-ENTMCNC: 31.7 G/DL — LOW (ref 32–36)
MCV RBC AUTO: 91.8 FL — SIGNIFICANT CHANGE UP (ref 80–100)
MONOCYTES # BLD AUTO: 0.6 K/UL — SIGNIFICANT CHANGE UP (ref 0–0.9)
MONOCYTES NFR BLD AUTO: 7.3 % — SIGNIFICANT CHANGE UP (ref 2–14)
NEUTROPHILS # BLD AUTO: 5.13 K/UL — SIGNIFICANT CHANGE UP (ref 1.8–7.4)
NEUTROPHILS NFR BLD AUTO: 62.1 % — SIGNIFICANT CHANGE UP (ref 43–77)
NRBC # BLD: 0 /100 WBCS — SIGNIFICANT CHANGE UP (ref 0–0)
PLATELET # BLD AUTO: 137 K/UL — LOW (ref 150–400)
RBC # BLD: 3.54 M/UL — LOW (ref 3.8–5.2)
RBC # FLD: 16.4 % — HIGH (ref 10.3–14.5)
WBC # BLD: 8.25 K/UL — SIGNIFICANT CHANGE UP (ref 3.8–10.5)
WBC # FLD AUTO: 8.25 K/UL — SIGNIFICANT CHANGE UP (ref 3.8–10.5)

## 2021-04-29 ENCOUNTER — NON-APPOINTMENT (OUTPATIENT)
Age: 76
End: 2021-04-29

## 2021-04-29 DIAGNOSIS — Z51.89 ENCOUNTER FOR OTHER SPECIFIED AFTERCARE: ICD-10-CM

## 2021-04-30 ENCOUNTER — NON-APPOINTMENT (OUTPATIENT)
Age: 76
End: 2021-04-30

## 2021-05-04 ENCOUNTER — RESULT REVIEW (OUTPATIENT)
Age: 76
End: 2021-05-04

## 2021-05-04 ENCOUNTER — APPOINTMENT (OUTPATIENT)
Dept: INFUSION THERAPY | Facility: HOSPITAL | Age: 76
End: 2021-05-04

## 2021-05-04 ENCOUNTER — LABORATORY RESULT (OUTPATIENT)
Age: 76
End: 2021-05-04

## 2021-05-04 LAB
BASOPHILS # BLD AUTO: 0 K/UL — SIGNIFICANT CHANGE UP (ref 0–0.2)
BASOPHILS NFR BLD AUTO: 0 % — SIGNIFICANT CHANGE UP (ref 0–2)
EOSINOPHIL # BLD AUTO: 0.16 K/UL — SIGNIFICANT CHANGE UP (ref 0–0.5)
EOSINOPHIL NFR BLD AUTO: 2 % — SIGNIFICANT CHANGE UP (ref 0–6)
HCT VFR BLD CALC: 30.5 % — LOW (ref 34.5–45)
HGB BLD-MCNC: 9.6 G/DL — LOW (ref 11.5–15.5)
LYMPHOCYTES # BLD AUTO: 1.55 K/UL — SIGNIFICANT CHANGE UP (ref 1–3.3)
LYMPHOCYTES # BLD AUTO: 19 % — SIGNIFICANT CHANGE UP (ref 13–44)
MCHC RBC-ENTMCNC: 29.4 PG — SIGNIFICANT CHANGE UP (ref 27–34)
MCHC RBC-ENTMCNC: 31.5 G/DL — LOW (ref 32–36)
MCV RBC AUTO: 93.3 FL — SIGNIFICANT CHANGE UP (ref 80–100)
MONOCYTES # BLD AUTO: 0.08 K/UL — SIGNIFICANT CHANGE UP (ref 0–0.9)
MONOCYTES NFR BLD AUTO: 1 % — LOW (ref 2–14)
NEUTROPHILS # BLD AUTO: 6.36 K/UL — SIGNIFICANT CHANGE UP (ref 1.8–7.4)
NEUTROPHILS NFR BLD AUTO: 78 % — HIGH (ref 43–77)
NRBC # BLD: 0 /100 — SIGNIFICANT CHANGE UP (ref 0–0)
NRBC # BLD: SIGNIFICANT CHANGE UP /100 WBCS (ref 0–0)
PLAT MORPH BLD: NORMAL — SIGNIFICANT CHANGE UP
PLATELET # BLD AUTO: 92 K/UL — LOW (ref 150–400)
RBC # BLD: 3.27 M/UL — LOW (ref 3.8–5.2)
RBC # FLD: 15.7 % — HIGH (ref 10.3–14.5)
RBC BLD AUTO: SIGNIFICANT CHANGE UP
WBC # BLD: 8.15 K/UL — SIGNIFICANT CHANGE UP (ref 3.8–10.5)
WBC # FLD AUTO: 8.15 K/UL — SIGNIFICANT CHANGE UP (ref 3.8–10.5)

## 2021-05-04 RX ORDER — ATORVASTATIN CALCIUM 80 MG/1
20 TABLET, FILM COATED ORAL
Qty: 0 | Refills: 0 | DISCHARGE

## 2021-05-04 RX ORDER — AMLODIPINE BESYLATE 2.5 MG/1
5 TABLET ORAL
Qty: 0 | Refills: 0 | DISCHARGE

## 2021-05-04 RX ORDER — METFORMIN HYDROCHLORIDE 850 MG/1
850 TABLET ORAL
Qty: 0 | Refills: 0 | DISCHARGE

## 2021-05-04 RX ORDER — LOSARTAN POTASSIUM 100 MG/1
50 TABLET, FILM COATED ORAL
Qty: 0 | Refills: 0 | DISCHARGE

## 2021-05-06 DIAGNOSIS — Z45.2 ENCOUNTER FOR ADJUSTMENT AND MANAGEMENT OF VASCULAR ACCESS DEVICE: ICD-10-CM

## 2021-05-17 NOTE — DISCUSSION/SUMMARY
[Radiation] : Radiation: No [Need for onging (adjuvant) treatment for cancer?] : Need for onging (adjuvant) treatment for cancer? No [FreeTextEntry1] : Adjuvant CMF initiated 1/6/21\par cyclophosphamide / methotrexate /5 fluorouracil  started as every 2 weeks and changed to \par every three weeks x 8 cycles due to side effects.  Completed 6/2021 [FreeTextEntry7] : Osteoporosis on alendronate.\par History of IDC right  breast 2015.ER positive/HER 2 negative. Right lumpectomy with sentinel lymph\par node biopsy @ [facility].Negative lymph nodes,low Oncotype Score, radiation therapy, aromatase inhibitor x 5 years ().

## 2021-05-17 NOTE — DISCUSSION/SUMMARY
[Radiation] : Radiation: No [Need for onging (adjuvant) treatment for cancer?] : Need for onging (adjuvant) treatment for cancer? No [FreeTextEntry1] : Adjuvant CMF initiated 1/6/21\par cyclophosphamide / methotrexate /5 fluorouracil  started as every 2 weeks and changed to \par every three weeks x 8 cycles due to side effects.  Completed 6/2021 [FreeTextEntry7] : Osteoporosis on alendronate.\par History of IDC right  breast 2015.ER positive/HER 2 negative. Right lumpectomy with sentinel lymph\par node biopsy @ [facility].Negative lymph nodes,low Oncotype Score, radiation therapy, aromatase inhibitor x 5 years ().

## 2021-05-19 ENCOUNTER — RESULT REVIEW (OUTPATIENT)
Age: 76
End: 2021-05-19

## 2021-05-19 ENCOUNTER — LABORATORY RESULT (OUTPATIENT)
Age: 76
End: 2021-05-19

## 2021-05-19 ENCOUNTER — APPOINTMENT (OUTPATIENT)
Dept: INFUSION THERAPY | Facility: HOSPITAL | Age: 76
End: 2021-05-19

## 2021-05-19 ENCOUNTER — APPOINTMENT (OUTPATIENT)
Dept: HEMATOLOGY ONCOLOGY | Facility: CLINIC | Age: 76
End: 2021-05-19
Payer: MEDICAID

## 2021-05-19 VITALS
TEMPERATURE: 97.5 F | DIASTOLIC BLOOD PRESSURE: 57 MMHG | OXYGEN SATURATION: 97 % | BODY MASS INDEX: 28.67 KG/M2 | HEART RATE: 93 BPM | WEIGHT: 142.2 LBS | HEIGHT: 59.06 IN | SYSTOLIC BLOOD PRESSURE: 97 MMHG | RESPIRATION RATE: 16 BRPM

## 2021-05-19 LAB
BASOPHILS # BLD AUTO: 0.03 K/UL — SIGNIFICANT CHANGE UP (ref 0–0.2)
BASOPHILS NFR BLD AUTO: 0.4 % — SIGNIFICANT CHANGE UP (ref 0–2)
EOSINOPHIL # BLD AUTO: 0.1 K/UL — SIGNIFICANT CHANGE UP (ref 0–0.5)
EOSINOPHIL NFR BLD AUTO: 1.3 % — SIGNIFICANT CHANGE UP (ref 0–6)
HCT VFR BLD CALC: 30.6 % — LOW (ref 34.5–45)
HGB BLD-MCNC: 9.8 G/DL — LOW (ref 11.5–15.5)
IMM GRANULOCYTES NFR BLD AUTO: 0.5 % — SIGNIFICANT CHANGE UP (ref 0–1.5)
LYMPHOCYTES # BLD AUTO: 1.73 K/UL — SIGNIFICANT CHANGE UP (ref 1–3.3)
LYMPHOCYTES # BLD AUTO: 22.3 % — SIGNIFICANT CHANGE UP (ref 13–44)
MCHC RBC-ENTMCNC: 30.2 PG — SIGNIFICANT CHANGE UP (ref 27–34)
MCHC RBC-ENTMCNC: 32 G/DL — SIGNIFICANT CHANGE UP (ref 32–36)
MCV RBC AUTO: 94.4 FL — SIGNIFICANT CHANGE UP (ref 80–100)
MONOCYTES # BLD AUTO: 0.59 K/UL — SIGNIFICANT CHANGE UP (ref 0–0.9)
MONOCYTES NFR BLD AUTO: 7.6 % — SIGNIFICANT CHANGE UP (ref 2–14)
NEUTROPHILS # BLD AUTO: 5.27 K/UL — SIGNIFICANT CHANGE UP (ref 1.8–7.4)
NEUTROPHILS NFR BLD AUTO: 67.9 % — SIGNIFICANT CHANGE UP (ref 43–77)
NRBC # BLD: 0 /100 WBCS — SIGNIFICANT CHANGE UP (ref 0–0)
PLATELET # BLD AUTO: 141 K/UL — LOW (ref 150–400)
RBC # BLD: 3.24 M/UL — LOW (ref 3.8–5.2)
RBC # FLD: 15.9 % — HIGH (ref 10.3–14.5)
WBC # BLD: 7.76 K/UL — SIGNIFICANT CHANGE UP (ref 3.8–10.5)
WBC # FLD AUTO: 7.76 K/UL — SIGNIFICANT CHANGE UP (ref 3.8–10.5)

## 2021-05-19 PROCEDURE — 99215 OFFICE O/P EST HI 40 MIN: CPT

## 2021-05-20 ENCOUNTER — OUTPATIENT (OUTPATIENT)
Dept: OUTPATIENT SERVICES | Facility: HOSPITAL | Age: 76
LOS: 1 days | Discharge: HOME | End: 2021-05-20

## 2021-05-20 DIAGNOSIS — C50.919 MALIGNANT NEOPLASM OF UNSPECIFIED SITE OF UNSPECIFIED FEMALE BREAST: ICD-10-CM

## 2021-05-20 DIAGNOSIS — Z90.11 ACQUIRED ABSENCE OF RIGHT BREAST AND NIPPLE: Chronic | ICD-10-CM

## 2021-05-24 NOTE — HISTORY OF PRESENT ILLNESS
[T: ___] : T[unfilled] [N: ___] : N[unfilled] [AJCC Stage: ____] : AJCC Stage: [unfilled] [de-identified] : 76 y/o female who presents for initial breast medical oncology consultation.\par \par The patient has a history of right breast IDC, ER/RI+, HER2-, [id number], diagnosed in 2015.  The patient underwent a right lumpectomy with SLNB at [facility] with negative margins, 5/5 negative LNs and low Oncotype score.  She was followed by radiation therapy completed 3/12/2015.  The patient resided in [facility] and took Arimidex for 5 years, completed 1/2020. Dr Linares \par \par She was followed with routine mammograms annually.  Mammogram in [facility] 3/2020- abnormal. Could not get care due to the pandemic. [place] and [place] imaging on 10/7/20. A right breast mass at the 8:00 axis, 7 cm from the nipple was discovered and biopsy was recommended.  The biopsy was done on 10/29/20 and pathology revealed an invasive ductal carcinoma, grade 3 ER/RI negative, HER2 IHC: negative.  \par \par The patient underwent a right mastectomy with SLNB by Dr. Nidhi Wilks from [facility] on  12/1/20.  Surgical pathology showed: Invasive ductal carcinoma   (1.7 cm, microscopic measurement), grade 3. No angiolymphatic or perineural invasion identified. Tumor is 3 cm from posterior margin. Monckaberg's arteriosclerosis.  No lymph nodes were discovered during surgery, therefore none submitted.\par \par Pertinent History:\par PMD: Dr. Cox\par PMH: HTN, DM\par Osteoporosis on Alendronate \par The patient takes gabapentin from hip pain\par FH: Daughter with history of sarcoma of the leg; Another daughter history of uterine cancer.  \par The patient is from [facility], she is a , has three children and lives with her daughter and son-in- law\par \par Grand daughter felt communication was very difficult with [facility] and switched care to [facility]. \par \par Fosamax stopped after 5 years\par Patient is very active, spending most day doing chores. Lately has been getting tired easily. Uses cane due to arthritis pain. Independent in ADL \par  [de-identified] : Ms. ROSEANN LU  is here for a follow up appt for right breast TNBC dx 10/2020, s/p right mastectomy. 1/6/2021 started adjuvant dd CMF chemo q2 weeks\par C2 held 1/20/2021 due to thrombocytopenia, Switched to Q3w treatment for better tolerance and enough time for count recovery.\par She developed severe neutropenia after C3. We discussed to dose reduce by 20%  with C4\par She was fasting last week for Ramadan but not today. She drank a lot of water. Chemo RN wasn’t able to get an IV line in after trying x 3. Patient reports she is tolerating chemo well since dose reduction and switching to q3w. I discussed that we can put in PICC line for IV access and finish last 3 treatments ( to complete 8 cycles). She was in agreement.\par Here for C7 today 5/19/21. \par Grand daughter Laura accompanied her today. reports pain and itching to left arm PICC line site after flushing on 5/17/21 no fevers no erythema or swelling to the site. 5/19/21/counts good Hbg 9.8, PLTs 141,ANC 5.27\par She reports mild-moderate FATIGUE D2 and D3 after chemo and altered taste x 3-4 days after chemo. She was able to function, maintained PO intake, weight stable. She had mild nausea, took Reglan, no vomiting, no diarrhea or mouth sores. She had mild bone pain/head aches. No neuropathy, no fevers. No hair loss.\par CT scans 12/2020 reviewed. Adrenal thickening is non specific. Will repeat CT after completion of chemo. Bone scan MARIE 12/2020\par Patient received both doses of covid vaccine

## 2021-05-24 NOTE — HISTORY OF PRESENT ILLNESS
[T: ___] : T[unfilled] [N: ___] : N[unfilled] [AJCC Stage: ____] : AJCC Stage: [unfilled] [de-identified] : 74 y/o female who presents for initial breast medical oncology consultation.\par \par The patient has a history of right breast IDC, ER/HI+, HER2-, [id number], diagnosed in 2015.  The patient underwent a right lumpectomy with SLNB at [facility] with negative margins, 5/5 negative LNs and low Oncotype score.  She was followed by radiation therapy completed 3/12/2015.  The patient resided in [facility] and took Arimidex for 5 years, completed 1/2020. Dr Linares \par \par She was followed with routine mammograms annually.  Mammogram in [facility] 3/2020- abnormal. Could not get care due to the pandemic. [place] and [place] imaging on 10/7/20. A right breast mass at the 8:00 axis, 7 cm from the nipple was discovered and biopsy was recommended.  The biopsy was done on 10/29/20 and pathology revealed an invasive ductal carcinoma, grade 3 ER/HI negative, HER2 IHC: negative.  \par \par The patient underwent a right mastectomy with SLNB by Dr. Nidhi Wilks from [facility] on  12/1/20.  Surgical pathology showed: Invasive ductal carcinoma   (1.7 cm, microscopic measurement), grade 3. No angiolymphatic or perineural invasion identified. Tumor is 3 cm from posterior margin. Monckaberg's arteriosclerosis.  No lymph nodes were discovered during surgery, therefore none submitted.\par \par Pertinent History:\par PMD: Dr. Cox\par PMH: HTN, DM\par Osteoporosis on Alendronate \par The patient takes gabapentin from hip pain\par FH: Daughter with history of sarcoma of the leg; Another daughter history of uterine cancer.  \par The patient is from [facility], she is a , has three children and lives with her daughter and son-in- law\par \par Grand daughter felt [place] was very difficult [place] [facility] and [place] [facility]. \par \par Fosamax stopped after 5 years\par Patient is very active, spending most day doing chores. Lately has been getting tired easily. Uses cane due to arthritis pain. Independent in ADL \par  [de-identified] : Ms. ROSEANN LU  is here for a follow up appt for right breast TNBC dx 10/2020, s/p right mastectomy. 1/6/2021 started adjuvant dd CMF chemo q2 weeks\par C2 held 1/20/2021 due to thrombocytopenia, Switched to Q3w treatment for better tolerance and enough time for count recovery.\par She developed severe neutropenia after C3. We discussed to dose reduce by 20%  with C4\par She was fasting last week for Ramadan but not today. She drank a lot of water. Chemo RN wasn’t able to get an IV line in after trying x 3. Patient reports she is tolerating chemo well since dose reduction and switching to q3w. I discussed that we can put in PICC line for IV access and finish last 3 treatments ( to complete 8 cycles). She was in agreement.\par Here for C7 today 5/19/21. \par Grand daughter Laura accompanied her today. reports pain and itching to left arm PICC line site after flushing on 5/17/21 no fevers no erythema or swelling to the site. 5/19/21/counts good Hbg 9.8, PLTs 141,ANC 5.27\par She reports mild-moderate FATIGUE D2 and D3 after chemo and altered taste x 3-4 days after chemo. She was able to function, maintained PO intake, weight stable. She had mild nausea, took Reglan, no vomiting, no diarrhea or mouth sores. She had mild bone pain/head aches. No neuropathy, no fevers. No hair loss.\par CT scans 12/2020 reviewed. Adrenal thickening is non specific. Will repeat CT after completion of chemo. Bone scan MARIE 12/2020\par Patient received both doses of covid vaccine

## 2021-05-24 NOTE — REASON FOR VISIT
[Follow-Up Visit] : a follow-up [Other: _____] : [unfilled] [FreeTextEntry2] : Right Breast Cancer [FreeTextEntry3] : Amharic speaking, family member, granddaughter Laura

## 2021-05-24 NOTE — REASON FOR VISIT
[Follow-Up Visit] : a follow-up [Other: _____] : [unfilled] [FreeTextEntry2] : Right Breast Cancer [FreeTextEntry3] : Czech speaking, family member, granddaughter Laura

## 2021-05-24 NOTE — END OF VISIT
[Time Spent: ___ minutes] : I have spent [unfilled] minutes of time on the encounter. [FreeTextEntry3] : C7 today. Pt tolerating well after dose reduction. CBC reviewed with pt and grand dtr. Plan to remove picc after C8\par CT scans after C8 to f/u on indeterminate findings

## 2021-05-24 NOTE — PHYSICAL EXAM
[Restricted in physically strenuous activity but ambulatory and able to carry out work of a light or sedentary nature] : Status 1- Restricted in physically strenuous activity but ambulatory and able to carry out work of a light or sedentary nature, e.g., light house work, office work [Normal] : affect appropriate [de-identified] : P [de-identified] : PICC line in place left arn site C/D/I no erythema or swelling. [de-identified] : Right mastectomy site is healing well with hyperpigmentation and dry skin noted along suture line.no s/s of infection noted

## 2021-05-24 NOTE — ASSESSMENT
[FreeTextEntry1] : In summary, Ms. ROSEANN LU is a 75 year old female with history of stage I right breast infiltrating ductal carcinoma, ER/NM positive and HER-2 negative diagnosed in 2015.  Oncotype score was 11.  She underwent breast conservation surgery, radiation and completed anastrozole for 5 years.  Now she is diagnosed with right breast new primary.  She underwent right mastectomy on 12/1/2020 which showed a poorly differentiated 1.7cm invasive ductal carcinoma. T1c Nx Mx Stage IA.  Lymph nodes were not detected due to previous history of sentinel lymph node dissection.  Tumor was triple negative. Patient is elderly, with well-managed comorbidities and overall good performance status.  \par \par - Breast ca: On adjuvant CMF chemo. Pt could not tolerate q2w dose dense regimen therefore switched to q3w. C 7/8 today. SHe developed severe neutropenia after C3 therefore will get chemo with 20% dose reduction today.  \par CBC reviewed with the patient today to make sure she is not neutropenic from high risk cancer therapy drug.  We will continue to monitor CBC every 2 to 4 weeks for intense drug monitoring. Patient is on cytotoxic chemotherapy and needs intensive monitoring for severe toxicity. \par - POOR IV ACCESS:CHEMO HELD 4/21/21 as Chemo RN wasn’t able to get an IV line in after trying x 3. Patient reports she is tolerating chemo well since dose reduction and switching to q3w. Discussed that we can put in PICC line for IV access and finish last 3 treatments ( to complete 8 cycles). She was in agreement. PICC line to left arm in place site care via visiting nurse on 5/17/21 after which she reports discomfort and itching to site,most likely 2/2 tight ace bandage dressing, no erythema or swelling to the site recommended to wear a less tight ace bandage and monitor \par -Chemo induces thrombocytopenia - She has mild thrombocytopenia 2/2 CHEMO which improved since dose reduction.5/19 21  Will  proceed with chemo today . \par - Chemo induced anemia- Grade 2. No transfusion needed. Monitor counts.\par - Chemo induced diarrhea-  Imodium PRN. Maintain PO hydration. BRAT diet\par - Chemo induced N/V- Will get premedications with Emend, dexamethasone and Aloxi. She will continue dexamethasone for next 3 days for antinausea prophylaxis. She will use Reglan as needed. \par - GF induced bone pain- She will take Claritin. \par - Chemo induced dysgeusia and fatigue: Encourage p.o. fluids, small frequent meals. Add IV fluids on D5 of each cycle weight stable \par - Instructed to call office and go directly to the emergency room with fever more than 100.4, shaking chills, productive cough, sore throat, shortness of breath or urinary symptoms. Patient verbalized understanding and agreement.\par - Emotional support provided, all questions answered.\par - CT scans 12/2020 reviewed. Adrenal thickening is non specific. Will repeat CT after completion of chemo. Bone scan MARIE 12/2020\par - She is referred for genetic counselling and testing.She went for testing 1/2021.  [facility] genetics negative\par \par RTO every 3 weeks for CMF with onpro and MD 6/9/21\par D6 for iV fluids \par BW ordered via [facility] , CMP

## 2021-05-24 NOTE — PHYSICAL EXAM
[Restricted in physically strenuous activity but ambulatory and able to carry out work of a light or sedentary nature] : Status 1- Restricted in physically strenuous activity but ambulatory and able to carry out work of a light or sedentary nature, e.g., light house work, office work [Normal] : affect appropriate [de-identified] : P [de-identified] : PICC line in place left arn site C/D/I no erythema or swelling. [de-identified] : Right mastectomy site is healing well with hyperpigmentation and dry skin noted along suture line.no s/s of infection noted

## 2021-05-25 ENCOUNTER — RESULT REVIEW (OUTPATIENT)
Age: 76
End: 2021-05-25

## 2021-05-25 ENCOUNTER — LABORATORY RESULT (OUTPATIENT)
Age: 76
End: 2021-05-25

## 2021-05-25 ENCOUNTER — APPOINTMENT (OUTPATIENT)
Dept: INFUSION THERAPY | Facility: HOSPITAL | Age: 76
End: 2021-05-25

## 2021-05-25 LAB
BASOPHILS # BLD AUTO: 0.07 K/UL — SIGNIFICANT CHANGE UP (ref 0–0.2)
BASOPHILS NFR BLD AUTO: 0.7 % — SIGNIFICANT CHANGE UP (ref 0–2)
EOSINOPHIL # BLD AUTO: 0.05 K/UL — SIGNIFICANT CHANGE UP (ref 0–0.5)
EOSINOPHIL NFR BLD AUTO: 0.5 % — SIGNIFICANT CHANGE UP (ref 0–6)
HCT VFR BLD CALC: 32.6 % — LOW (ref 34.5–45)
HGB BLD-MCNC: 10.6 G/DL — LOW (ref 11.5–15.5)
IMM GRANULOCYTES NFR BLD AUTO: 2 % — HIGH (ref 0–1.5)
LYMPHOCYTES # BLD AUTO: 1.29 K/UL — SIGNIFICANT CHANGE UP (ref 1–3.3)
LYMPHOCYTES # BLD AUTO: 13.5 % — SIGNIFICANT CHANGE UP (ref 13–44)
MCHC RBC-ENTMCNC: 30.5 PG — SIGNIFICANT CHANGE UP (ref 27–34)
MCHC RBC-ENTMCNC: 32.5 G/DL — SIGNIFICANT CHANGE UP (ref 32–36)
MCV RBC AUTO: 93.7 FL — SIGNIFICANT CHANGE UP (ref 80–100)
MONOCYTES # BLD AUTO: 0.15 K/UL — SIGNIFICANT CHANGE UP (ref 0–0.9)
MONOCYTES NFR BLD AUTO: 1.6 % — LOW (ref 2–14)
NEUTROPHILS # BLD AUTO: 7.82 K/UL — HIGH (ref 1.8–7.4)
NEUTROPHILS NFR BLD AUTO: 81.7 % — HIGH (ref 43–77)
NRBC # BLD: 0 /100 WBCS — SIGNIFICANT CHANGE UP (ref 0–0)
PLATELET # BLD AUTO: 133 K/UL — LOW (ref 150–400)
RBC # BLD: 3.48 M/UL — LOW (ref 3.8–5.2)
RBC # FLD: 15.5 % — HIGH (ref 10.3–14.5)
WBC # BLD: 9.57 K/UL — SIGNIFICANT CHANGE UP (ref 3.8–10.5)
WBC # FLD AUTO: 9.57 K/UL — SIGNIFICANT CHANGE UP (ref 3.8–10.5)

## 2021-05-26 DIAGNOSIS — E86.0 DEHYDRATION: ICD-10-CM

## 2021-06-07 ENCOUNTER — OUTPATIENT (OUTPATIENT)
Dept: OUTPATIENT SERVICES | Facility: HOSPITAL | Age: 76
LOS: 1 days | Discharge: HOME | End: 2021-06-07

## 2021-06-07 DIAGNOSIS — C50.919 MALIGNANT NEOPLASM OF UNSPECIFIED SITE OF UNSPECIFIED FEMALE BREAST: ICD-10-CM

## 2021-06-07 DIAGNOSIS — Z90.11 ACQUIRED ABSENCE OF RIGHT BREAST AND NIPPLE: Chronic | ICD-10-CM

## 2021-06-07 RX ORDER — BLOOD SUGAR DIAGNOSTIC
STRIP MISCELLANEOUS 3 TIMES DAILY
Qty: 270 | Refills: 2 | Status: COMPLETED | COMMUNITY
Start: 2021-06-07 | End: 2022-03-04

## 2021-06-07 RX ORDER — BLOOD-GLUCOSE METER
KIT MISCELLANEOUS
Qty: 1 | Refills: 0 | Status: ACTIVE | COMMUNITY
Start: 2021-06-07 | End: 1900-01-01

## 2021-06-09 ENCOUNTER — APPOINTMENT (OUTPATIENT)
Dept: INFUSION THERAPY | Facility: HOSPITAL | Age: 76
End: 2021-06-09

## 2021-06-09 ENCOUNTER — RESULT REVIEW (OUTPATIENT)
Age: 76
End: 2021-06-09

## 2021-06-09 ENCOUNTER — LABORATORY RESULT (OUTPATIENT)
Age: 76
End: 2021-06-09

## 2021-06-09 ENCOUNTER — APPOINTMENT (OUTPATIENT)
Dept: HEMATOLOGY ONCOLOGY | Facility: CLINIC | Age: 76
End: 2021-06-09
Payer: MEDICAID

## 2021-06-09 VITALS
DIASTOLIC BLOOD PRESSURE: 63 MMHG | SYSTOLIC BLOOD PRESSURE: 101 MMHG | TEMPERATURE: 97.3 F | BODY MASS INDEX: 28.89 KG/M2 | WEIGHT: 143.3 LBS | HEIGHT: 59 IN | HEART RATE: 98 BPM | RESPIRATION RATE: 16 BRPM | OXYGEN SATURATION: 98 %

## 2021-06-09 DIAGNOSIS — D70.1 AGRANULOCYTOSIS SECONDARY TO CANCER CHEMOTHERAPY: ICD-10-CM

## 2021-06-09 DIAGNOSIS — T45.1X5A AGRANULOCYTOSIS SECONDARY TO CANCER CHEMOTHERAPY: ICD-10-CM

## 2021-06-09 DIAGNOSIS — K52.1 TOXIC GASTROENTERITIS AND COLITIS: ICD-10-CM

## 2021-06-09 DIAGNOSIS — D64.81 ANEMIA DUE TO ANTINEOPLASTIC CHEMOTHERAPY: ICD-10-CM

## 2021-06-09 DIAGNOSIS — R53.83 OTHER FATIGUE: ICD-10-CM

## 2021-06-09 DIAGNOSIS — T45.1X5A TOXIC GASTROENTERITIS AND COLITIS: ICD-10-CM

## 2021-06-09 DIAGNOSIS — Z79.899 OTHER LONG TERM (CURRENT) DRUG THERAPY: ICD-10-CM

## 2021-06-09 DIAGNOSIS — R11.0 NAUSEA: ICD-10-CM

## 2021-06-09 DIAGNOSIS — T45.1X5A NAUSEA: ICD-10-CM

## 2021-06-09 DIAGNOSIS — D69.59 OTHER SECONDARY THROMBOCYTOPENIA: ICD-10-CM

## 2021-06-09 DIAGNOSIS — T45.1X5A OTHER SECONDARY THROMBOCYTOPENIA: ICD-10-CM

## 2021-06-09 DIAGNOSIS — T45.1X5A ANEMIA DUE TO ANTINEOPLASTIC CHEMOTHERAPY: ICD-10-CM

## 2021-06-09 DIAGNOSIS — R43.2 PARAGEUSIA: ICD-10-CM

## 2021-06-09 LAB
BASOPHILS # BLD AUTO: 0.03 K/UL — SIGNIFICANT CHANGE UP (ref 0–0.2)
BASOPHILS NFR BLD AUTO: 0.4 % — SIGNIFICANT CHANGE UP (ref 0–2)
EOSINOPHIL # BLD AUTO: 0.15 K/UL — SIGNIFICANT CHANGE UP (ref 0–0.5)
EOSINOPHIL NFR BLD AUTO: 2.1 % — SIGNIFICANT CHANGE UP (ref 0–6)
HCT VFR BLD CALC: 31.8 % — LOW (ref 34.5–45)
HGB BLD-MCNC: 10.2 G/DL — LOW (ref 11.5–15.5)
IMM GRANULOCYTES NFR BLD AUTO: 1 % — SIGNIFICANT CHANGE UP (ref 0–1.5)
LYMPHOCYTES # BLD AUTO: 1.88 K/UL — SIGNIFICANT CHANGE UP (ref 1–3.3)
LYMPHOCYTES # BLD AUTO: 25.9 % — SIGNIFICANT CHANGE UP (ref 13–44)
MCHC RBC-ENTMCNC: 29.4 PG — SIGNIFICANT CHANGE UP (ref 27–34)
MCHC RBC-ENTMCNC: 32.1 G/DL — SIGNIFICANT CHANGE UP (ref 32–36)
MCV RBC AUTO: 91.6 FL — SIGNIFICANT CHANGE UP (ref 80–100)
MONOCYTES # BLD AUTO: 0.6 K/UL — SIGNIFICANT CHANGE UP (ref 0–0.9)
MONOCYTES NFR BLD AUTO: 8.3 % — SIGNIFICANT CHANGE UP (ref 2–14)
NEUTROPHILS # BLD AUTO: 4.52 K/UL — SIGNIFICANT CHANGE UP (ref 1.8–7.4)
NEUTROPHILS NFR BLD AUTO: 62.3 % — SIGNIFICANT CHANGE UP (ref 43–77)
NRBC # BLD: 0 /100 WBCS — SIGNIFICANT CHANGE UP (ref 0–0)
PLATELET # BLD AUTO: 146 K/UL — LOW (ref 150–400)
RBC # BLD: 3.47 M/UL — LOW (ref 3.8–5.2)
RBC # FLD: 14.9 % — HIGH (ref 10.3–14.5)
WBC # BLD: 7.25 K/UL — SIGNIFICANT CHANGE UP (ref 3.8–10.5)
WBC # FLD AUTO: 7.25 K/UL — SIGNIFICANT CHANGE UP (ref 3.8–10.5)

## 2021-06-09 PROCEDURE — 99215 OFFICE O/P EST HI 40 MIN: CPT

## 2021-06-10 ENCOUNTER — NON-APPOINTMENT (OUTPATIENT)
Age: 76
End: 2021-06-10

## 2021-06-10 DIAGNOSIS — R11.2 NAUSEA WITH VOMITING, UNSPECIFIED: ICD-10-CM

## 2021-06-10 DIAGNOSIS — Z51.89 ENCOUNTER FOR OTHER SPECIFIED AFTERCARE: ICD-10-CM

## 2021-06-10 DIAGNOSIS — Z51.11 ENCOUNTER FOR ANTINEOPLASTIC CHEMOTHERAPY: ICD-10-CM

## 2021-06-10 NOTE — PROCEDURE NOTE - NSTIMEOUT_GEN_A_CORE
Left vm for patient to return call.   Patient's first and last name, , procedure, and correct site confirmed prior to the start of procedure.

## 2021-06-11 ENCOUNTER — NON-APPOINTMENT (OUTPATIENT)
Age: 76
End: 2021-06-11

## 2021-06-13 PROBLEM — R11.0 CHEMOTHERAPY-INDUCED NAUSEA: Status: ACTIVE | Noted: 2021-01-06

## 2021-06-13 PROBLEM — R53.83 FATIGUE: Status: ACTIVE | Noted: 2021-01-20

## 2021-06-13 PROBLEM — K52.1 CHEMOTHERAPY-INDUCED DIARRHEA: Status: ACTIVE | Noted: 2021-01-06

## 2021-06-13 PROBLEM — Z79.899 ON ANTINEOPLASTIC CHEMOTHERAPY: Status: ACTIVE | Noted: 2021-01-06

## 2021-06-13 PROBLEM — R43.2 DYSGEUSIA: Status: ACTIVE | Noted: 2021-01-28

## 2021-06-13 PROBLEM — D69.59 CHEMOTHERAPY-INDUCED THROMBOCYTOPENIA: Status: ACTIVE | Noted: 2021-01-20

## 2021-06-13 PROBLEM — D70.1 CHEMOTHERAPY-INDUCED NEUTROPENIA: Status: ACTIVE | Noted: 2021-03-11

## 2021-06-13 NOTE — ASSESSMENT
[FreeTextEntry1] : In summary, Ms. ROSEANN LU is a 75 year old female with history of stage I right breast infiltrating ductal carcinoma, ER/OK positive and HER-2 negative diagnosed in 2015.  Oncotype score was 11.  She underwent breast conservation surgery, radiation and completed anastrozole for 5 years.  Now she is diagnosed with right breast new primary.  She underwent right mastectomy on 12/1/2020 which showed a poorly differentiated 1.7cm invasive ductal carcinoma. T1c Nx Mx Stage IA.  Lymph nodes were not detected due to previous history of sentinel lymph node dissection.  Tumor was triple negative. Patient is elderly, with well-managed comorbidities and overall good performance status.  \par \par - Breast ca: On adjuvant CMF chemo. Pt could not tolerate q2w dose dense regimen therefore switched to q3w. C 8/8 today. SHe developed severe neutropenia after C3 therefore will get chemo with 20% dose reduction today.  \par CBC reviewed with the patient today to make sure she is not neutropenic from high risk cancer therapy drug.  We will continue to monitor CBC every 2 to 4 weeks for intense drug monitoring. Patient is on cytotoxic chemotherapy and needs intensive monitoring for severe toxicity. \par - POOR IV ACCESS:CHEMO HELD 4/21/21 as Chemo RN wasn’t able to get an IV line in after trying x 3. PICC placed last month. Pt requesting to remove asap after last chemo. Order placed. \par -Chemo induces thrombocytopenia - She has mild thrombocytopenia 2/2 CHEMO which improved since dose reduction.5/19 21  Will  proceed with chemo today . \par - Chemo induced anemia- Grade 2. No transfusion needed. Monitor counts.\par - Chemo induced diarrhea-  Imodium PRN. Maintain PO hydration. BRAT diet\par - Chemo induced N/V- Will get premedications with Emend, dexamethasone and Aloxi. She will continue dexamethasone for next 3 days for antinausea prophylaxis. She will use Reglan as needed. \par - GF induced bone pain- She will take Claritin. \par - Chemo induced dysgeusia and fatigue: Encourage p.o. fluids, small frequent meals. Add IV fluids on D5 of each cycle weight stable \par - Instructed to call office and go directly to the emergency room with fever more than 100.4, shaking chills, productive cough, sore throat, shortness of breath or urinary symptoms. Patient verbalized understanding and agreement.\par - Emotional support provided, all questions answered.\par - CT scans 12/2020 reviewed. Adrenal thickening is non specific. Will repeat CT after completion of chemo. Bone scan MARIE 12/2020\par - She is referred for genetic counselling and testing.She went for testing 1/2021.  [facility] genetics negative\par - High BS 2/2 STEROIDS. Monitor BS\par - Last chemo today. Remove picc. Not a candidate for endocrine therapy or RT. repeat CT. F/u after CT\par \par RTO every 3m \par BW  and chemo ordered in [facility]\par CT 7/14\par md 7/21

## 2021-06-13 NOTE — PHYSICAL EXAM
[Restricted in physically strenuous activity but ambulatory and able to carry out work of a light or sedentary nature] : Status 1- Restricted in physically strenuous activity but ambulatory and able to carry out work of a light or sedentary nature, e.g., light house work, office work [Normal] : affect appropriate [de-identified] : PICC line in place left arn site C/D/I no erythema or swelling. [de-identified] : Right mastectomy site is healing well with hyperpigmentation and dry skin noted along suture line.no s/s of infection noted

## 2021-06-13 NOTE — HISTORY OF PRESENT ILLNESS
[T: ___] : T[unfilled] [N: ___] : N[unfilled] [AJCC Stage: ____] : AJCC Stage: [unfilled] [de-identified] : 74 y/o female who presents for initial breast medical oncology consultation.\par \par The patient has a history of right breast IDC, ER/IN+, HER2-, [id number], diagnosed in 2015.  The patient underwent a right lumpectomy with SLNB at [facility] with negative margins, 5/5 negative LNs and low Oncotype score.  She was followed by radiation therapy completed 3/12/2015.  The patient resided in [facility] and took Arimidex for 5 years, completed 1/2020. Dr Linares \par \par She was followed with routine mammograms annually.  Mammogram in [facility] 3/2020- abnormal. Could not get care due to the pandemic. [place] and [place] imaging on 10/7/20. A right breast mass at the 8:00 axis, 7 cm from the nipple was discovered and biopsy was recommended.  The biopsy was done on 10/29/20 and pathology revealed an invasive ductal carcinoma, grade 3 ER/IN negative, HER2 IHC: negative.  \par \par The patient underwent a right mastectomy with SLNB by Dr. Nidhi Wilks from [facility] on  12/1/20.  Surgical pathology showed: Invasive ductal carcinoma   (1.7 cm, microscopic measurement), grade 3. No angiolymphatic or perineural invasion identified. Tumor is 3 cm from posterior margin. Monckaberg's arteriosclerosis.  No lymph nodes were discovered during surgery, therefore none submitted.\par \par Pertinent History:\par PMD: Dr. Cox\par PMH: HTN, DM\par Osteoporosis on Alendronate \par The patient takes gabapentin from hip pain\par FH: Daughter with history of sarcoma of the leg; Another daughter history of uterine cancer.  \par The patient is from [facility], she is a , has three children and lives with her daughter and son-in- law\par \par Grand daughter felt [place] [facility] [place] [facility]. \par \par Fosamax stopped after 5 years\par Patient is very active, spending most day doing chores. Lately has been getting tired easily. Uses cane due to arthritis pain. Independent in ADL \par  [de-identified] : Ms. ROSEANN LU  is here for a follow up appt for right breast TNBC dx 10/2020, s/p right mastectomy. 1/6/2021 started adjuvant dd CMF chemo q2 weeks\par C2 held 1/20/2021 due to thrombocytopenia, Switched to Q3w treatment for better tolerance and enough time for count recovery.\par She developed severe neutropenia after C3. We discussed to dose reduce by 20%  with C4\par Here for C8 today 6/9/21\par Grand daughter Laura accompanied her today. \par She reports mild-moderate FATIGUE D2 and D3 after chemo and altered taste x 3-4 days after chemo. She was able to function, maintained PO intake, weight stable. She had mild nausea, took Reglan, no vomiting, no diarrhea or mouth sores. She had mild bone pain/head aches. No neuropathy, no fevers. No hair loss.\par CT scans 12/2020 reviewed. Adrenal thickening is non specific. Will repeat CT after completion of chemo. Bone scan MARIE 12/2020\par Patient received both doses of covid vaccine\par Discussed picc removal asap as pt is annoyed by it. Its not painful or infected. Also would repeat CT to f/u on adrenal thickening

## 2021-06-13 NOTE — REASON FOR VISIT
[Follow-Up Visit] : a follow-up [Other: _____] : [unfilled] [FreeTextEntry2] : Right Breast Cancer [FreeTextEntry3] : Slovenian speaking, family member, granddaughter Laura

## 2021-06-15 ENCOUNTER — LABORATORY RESULT (OUTPATIENT)
Age: 76
End: 2021-06-15

## 2021-06-15 ENCOUNTER — APPOINTMENT (OUTPATIENT)
Dept: INFUSION THERAPY | Facility: HOSPITAL | Age: 76
End: 2021-06-15

## 2021-06-23 NOTE — PHYSICAL EXAM
[Restricted in physically strenuous activity but ambulatory and able to carry out work of a light or sedentary nature] : Status 1- Restricted in physically strenuous activity but ambulatory and able to carry out work of a light or sedentary nature, e.g., light house work, office work [Normal] : affect appropriate [de-identified] : Mild erythema noted to dorsum of right hand at IV site from 1/20/21 no swelling or s/s phlebitis noted  [de-identified] : Right mastectomy site is healing well.

## 2021-06-23 NOTE — REASON FOR VISIT
[Follow-Up Visit] : a follow-up [Other: _____] : [unfilled] [FreeTextEntry2] : Right Breast Cancer [FreeTextEntry3] : Arabic speaking, family member, granddaughter on speaker phone translating per pt's request

## 2021-06-23 NOTE — ASSESSMENT
[FreeTextEntry1] : In summary, Ms. ROSEANN LU is a 75 year old female with history of stage I right breast infiltrating ductal carcinoma, ER/ND positive and HER-2 negative diagnosed in 2015.  Oncotype score was 11.  She underwent breast conservation surgery, radiation and completed anastrozole for 5 years.  Now she is diagnosed with right breast new primary.  She underwent right mastectomy on 12/1/2020 which showed a poorly differentiated 1.7cm invasive ductal carcinoma. T1c Nx Mx Stage IA.  Lymph nodes were not detected due to previous history of sentinel lymph node dissection.  Tumor was triple negative. Patient is elderly, with well-managed comorbidities and overall good performance status.  \par \par - Breast ca: On adjuvant CMF chemo. Pt could not tolerate q2w dose dense regimen therefore switched to q3w. C 3/8 today. Chemo tolerated with expected side effects 1/20/2021 PLT 97,000 chemo HELD and rescheduled for 1/27/21.. PLT counts stable today  2/17/21 PLTS 105,000. \par -Chemo induces thrombocytopenia - Grade 1 CHEMO HELD,. Reschedule for 1/27/2021 then resume at q 3 weeks PLTS stable today\par - Chemo induced anemia- Grade 1. No transfusion needed. Monitor counts.  \par - Chemo induced diarrhea-  Imodium PRN. Maintain PO hydration. BRAT diet\par - Chemo induced N/V- Will get premedications with Emend, dexamethasone and Aloxi. She will continue dexamethasone for next 3 days for antinausea prophylaxis. She will use Reglan as needed. \par - GF induced bone pain- She will take Claritin. \par - Chemo induced dysgeusia and fatigue: Encourage p.o. fluids, small frequent meals.\par - Instructed to call office and go directly to the emergency room with fever more than 100.4, shaking chills, productive cough, sore throat, shortness of breath or urinary symptoms. Patient verbalized understanding and agreement.\par - Emotional support provided, all questions answered.\par - CT scans 12/2020 reviewed. Adrenal thickening is non specific. Will repeat CT after completion of chemo. Bone scan MARIE 12/2020\par - She is referred for genetic counselling and testing. \par \par RTO 3 weeks for CMF with onpro and MD\par D/w  Dr. Nayeli Escobedo\par

## 2021-06-23 NOTE — CONSULT LETTER
Blood sugar noted of 230 nonfasting at 5 PM.  Please notify patient that her blood sugar was quite high. She may have had a soda or sugary drink or carbohydrates in the preceding hours before the blood test.  In any event it was quite high.   I left an ord [Dear  ___] : Dear  [unfilled], [Consult Letter:] : I had the pleasure of evaluating your patient, [unfilled]. [Please see my note below.] : Please see my note below. [Consult Closing:] : Thank you very much for allowing me to participate in the care of this patient.  If you have any questions, please do not hesitate to contact me. [Sincerely,] : Sincerely, [FreeTextEntry3] : Nayeli Escobedo MD\par Division of Medical Oncology and Hematology\par [facility]\par [employer] [facility]\par [place], [place]\par \par

## 2021-07-07 ENCOUNTER — OUTPATIENT (OUTPATIENT)
Dept: OUTPATIENT SERVICES | Facility: HOSPITAL | Age: 76
LOS: 1 days | End: 2021-07-07
Payer: MEDICAID

## 2021-07-07 ENCOUNTER — APPOINTMENT (OUTPATIENT)
Dept: CT IMAGING | Facility: IMAGING CENTER | Age: 76
End: 2021-07-07
Payer: MEDICAID

## 2021-07-07 ENCOUNTER — RESULT REVIEW (OUTPATIENT)
Age: 76
End: 2021-07-07

## 2021-07-07 DIAGNOSIS — Z00.8 ENCOUNTER FOR OTHER GENERAL EXAMINATION: ICD-10-CM

## 2021-07-07 DIAGNOSIS — Z90.11 ACQUIRED ABSENCE OF RIGHT BREAST AND NIPPLE: Chronic | ICD-10-CM

## 2021-07-07 DIAGNOSIS — C50.919 MALIGNANT NEOPLASM OF UNSPECIFIED SITE OF UNSPECIFIED FEMALE BREAST: ICD-10-CM

## 2021-07-07 PROCEDURE — 71260 CT THORAX DX C+: CPT | Mod: 26

## 2021-07-07 PROCEDURE — 74177 CT ABD & PELVIS W/CONTRAST: CPT

## 2021-07-07 PROCEDURE — 71260 CT THORAX DX C+: CPT

## 2021-07-07 PROCEDURE — 74177 CT ABD & PELVIS W/CONTRAST: CPT | Mod: 26

## 2021-07-20 ENCOUNTER — OUTPATIENT (OUTPATIENT)
Dept: OUTPATIENT SERVICES | Facility: HOSPITAL | Age: 76
LOS: 1 days | Discharge: HOME | End: 2021-07-20

## 2021-07-20 DIAGNOSIS — C50.919 MALIGNANT NEOPLASM OF UNSPECIFIED SITE OF UNSPECIFIED FEMALE BREAST: ICD-10-CM

## 2021-07-20 DIAGNOSIS — Z90.11 ACQUIRED ABSENCE OF RIGHT BREAST AND NIPPLE: Chronic | ICD-10-CM

## 2021-07-21 ENCOUNTER — APPOINTMENT (OUTPATIENT)
Dept: HEMATOLOGY ONCOLOGY | Facility: CLINIC | Age: 76
End: 2021-07-21
Payer: MEDICAID

## 2021-07-21 ENCOUNTER — RESULT REVIEW (OUTPATIENT)
Age: 76
End: 2021-07-21

## 2021-07-21 VITALS
SYSTOLIC BLOOD PRESSURE: 114 MMHG | HEART RATE: 61 BPM | HEIGHT: 58.98 IN | DIASTOLIC BLOOD PRESSURE: 77 MMHG | RESPIRATION RATE: 16 BRPM | OXYGEN SATURATION: 98 % | WEIGHT: 143.3 LBS | BODY MASS INDEX: 28.89 KG/M2 | TEMPERATURE: 97.7 F

## 2021-07-21 LAB
BASOPHILS # BLD AUTO: 0.04 K/UL — SIGNIFICANT CHANGE UP (ref 0–0.2)
BASOPHILS NFR BLD AUTO: 0.6 % — SIGNIFICANT CHANGE UP (ref 0–2)
EOSINOPHIL # BLD AUTO: 0.19 K/UL — SIGNIFICANT CHANGE UP (ref 0–0.5)
EOSINOPHIL NFR BLD AUTO: 3 % — SIGNIFICANT CHANGE UP (ref 0–6)
HCT VFR BLD CALC: 34.1 % — LOW (ref 34.5–45)
HGB BLD-MCNC: 10.8 G/DL — LOW (ref 11.5–15.5)
IMM GRANULOCYTES NFR BLD AUTO: 0.5 % — SIGNIFICANT CHANGE UP (ref 0–1.5)
LYMPHOCYTES # BLD AUTO: 1.45 K/UL — SIGNIFICANT CHANGE UP (ref 1–3.3)
LYMPHOCYTES # BLD AUTO: 23.2 % — SIGNIFICANT CHANGE UP (ref 13–44)
MCHC RBC-ENTMCNC: 29.7 PG — SIGNIFICANT CHANGE UP (ref 27–34)
MCHC RBC-ENTMCNC: 31.7 G/DL — LOW (ref 32–36)
MCV RBC AUTO: 93.7 FL — SIGNIFICANT CHANGE UP (ref 80–100)
MONOCYTES # BLD AUTO: 0.44 K/UL — SIGNIFICANT CHANGE UP (ref 0–0.9)
MONOCYTES NFR BLD AUTO: 7.1 % — SIGNIFICANT CHANGE UP (ref 2–14)
NEUTROPHILS # BLD AUTO: 4.09 K/UL — SIGNIFICANT CHANGE UP (ref 1.8–7.4)
NEUTROPHILS NFR BLD AUTO: 65.6 % — SIGNIFICANT CHANGE UP (ref 43–77)
NRBC # BLD: 0 /100 WBCS — SIGNIFICANT CHANGE UP (ref 0–0)
PLATELET # BLD AUTO: 137 K/UL — LOW (ref 150–400)
RBC # BLD: 3.64 M/UL — LOW (ref 3.8–5.2)
RBC # FLD: 14.6 % — HIGH (ref 10.3–14.5)
WBC # BLD: 6.24 K/UL — SIGNIFICANT CHANGE UP (ref 3.8–10.5)
WBC # FLD AUTO: 6.24 K/UL — SIGNIFICANT CHANGE UP (ref 3.8–10.5)

## 2021-07-21 PROCEDURE — 99213 OFFICE O/P EST LOW 20 MIN: CPT

## 2021-07-22 LAB
ALBUMIN SERPL ELPH-MCNC: 4.3 G/DL
ALP BLD-CCNC: 79 U/L
ALT SERPL-CCNC: 17 U/L
ANION GAP SERPL CALC-SCNC: 13 MMOL/L
AST SERPL-CCNC: 15 U/L
BILIRUB SERPL-MCNC: 0.2 MG/DL
BUN SERPL-MCNC: 13 MG/DL
CALCIUM SERPL-MCNC: 9.4 MG/DL
CHLORIDE SERPL-SCNC: 104 MMOL/L
CO2 SERPL-SCNC: 23 MMOL/L
CREAT SERPL-MCNC: 0.71 MG/DL
GLUCOSE SERPL-MCNC: 206 MG/DL
POTASSIUM SERPL-SCNC: 4.6 MMOL/L
PROT SERPL-MCNC: 7 G/DL
SODIUM SERPL-SCNC: 140 MMOL/L

## 2021-07-22 NOTE — ASSESSMENT
[FreeTextEntry1] : In summary, Ms. ROSEANN LU is a 75 year old female with history of stage I right breast infiltrating ductal carcinoma, ER/TN positive and HER-2 negative diagnosed in 2015.  Oncotype score was 11.  She underwent breast conservation surgery, radiation and completed anastrozole for 5 years.  Now she is diagnosed with right breast new primary.  She underwent right mastectomy on 12/1/2020 which showed a poorly differentiated 1.7cm invasive ductal carcinoma. T1c Nx Mx Stage IA.  Lymph nodes were not detected due to previous history of sentinel lymph node dissection.  Tumor was triple negative. Patient is elderly, with well-managed comorbidities and overall good performance status.  \par \par - Breast ca: Patient has completed adjuvant CMF chemotherapy x8 cycles 6/2021.  She is here today to discuss CT scan results.  CT scan showed stable adrenal nodule/thickening.  No concern for metastatic disease.  She is noted to have endometrial findings.  Referred to GYN.  Referral sent via [facility].  Routine blood work today showed mild cytopenias likely secondary to chemotherapy.  We will repeat blood work in 3 months.  PICC line has been removed.  She is due for left breast imaging.  Mammogram/sonogram ordered\par \par \par RTO every 3m \par

## 2021-07-22 NOTE — REASON FOR VISIT
[Follow-Up Visit] : a follow-up [Other: _____] : [unfilled] [Family Member] : family member [FreeTextEntry2] : Right Breast Cancer [FreeTextEntry3] : Frisian speaking, family member, granddaughter Laura

## 2021-07-22 NOTE — HISTORY OF PRESENT ILLNESS
[T: ___] : T[unfilled] [N: ___] : N[unfilled] [AJCC Stage: ____] : AJCC Stage: [unfilled] [de-identified] : 74 y/o female who presents for initial breast medical oncology consultation.\par \par The patient has a history of right breast IDC, ER/NJ+, HER2-, [id number], diagnosed in 2015.  The patient underwent a right lumpectomy with SLNB at [facility] with negative margins, 5/5 negative LNs and low Oncotype score.  She was followed by radiation therapy completed 3/12/2015.  The patient resided in [facility] and took Arimidex for 5 years, completed 1/2020. Dr Linares \par \par She was followed with routine mammograms annually.  Mammogram in [facility] 3/2020- abnormal. Could not get care due to the pandemic. [place] and [place] imaging on 10/7/20. A right breast mass at the 8:00 axis, 7 cm from the nipple was discovered and biopsy was recommended.  The biopsy was done on 10/29/20 and pathology revealed an invasive ductal carcinoma, grade 3 ER/NJ negative, HER2 IHC: negative.  \par \par The patient underwent a right mastectomy with SLNB by Dr. Nidhi Wilks from [facility] on  12/1/20.  Surgical pathology showed: Invasive ductal carcinoma   (1.7 cm, microscopic measurement), grade 3. No angiolymphatic or perineural invasion identified. Tumor is 3 cm from posterior margin. Monckaberg's arteriosclerosis.  No lymph nodes were discovered during surgery, therefore none submitted.\par \par Pertinent History:\par PMD: Dr. Cox\par PMH: HTN, DM\par Osteoporosis on Alendronate \par The patient takes gabapentin from hip pain\par FH: Daughter with history of sarcoma of the leg; Another daughter history of uterine cancer.  \par The patient is from [facility], she is a , has three children and lives with her daughter and son-in- law\par \par Grand daughter felt communication was very difficult with [facility] and switched care to [facility]. \par \par Fosamax stopped after 5 years\par Patient is very active, spending most day doing chores. Lately has been getting tired easily. Uses cane due to arthritis pain. Independent in ADL \par  [de-identified] : Ms. ROSEANN LU  is here for a follow up appt for right breast TNBC dx 10/2020, s/p right mastectomy. 1/6/2021 started adjuvant dd CMF chemo q2 weeks. Switched to Q3w treatment for better tolerance and enough time for count recovery. She developed severe neutropenia after C3, dose reduce by 20%  with C4. Completed C8 6/9/21\par Pt is here with her dtr in person and grand dtr on phone\par She reports chemo s/e are resolved. PICC removed. Appetite better, eating well, good energy and wt stable\par CT scans 12/2020 reviewed. Adrenal thickening is non specific. Bone scan MARIE 12/2020\par Patient had imaging done on 7/14/21 Upon review of CT films, my interpretation is that patient has stable adrenal thickening/nodule. No concern for mets. She is referred to GYN for f/u endometrial findings on CT. I reviewed these findings with the patient

## 2021-07-22 NOTE — PHYSICAL EXAM
[Restricted in physically strenuous activity but ambulatory and able to carry out work of a light or sedentary nature] : Status 1- Restricted in physically strenuous activity but ambulatory and able to carry out work of a light or sedentary nature, e.g., light house work, office work [Normal] : affect appropriate [de-identified] : PICC line removed [de-identified] : Right mastectomy site is healed well, no chest wall or axillary nodules

## 2021-08-04 ENCOUNTER — OUTPATIENT (OUTPATIENT)
Dept: OUTPATIENT SERVICES | Facility: HOSPITAL | Age: 76
LOS: 1 days | End: 2021-08-04

## 2021-08-04 DIAGNOSIS — Z90.11 ACQUIRED ABSENCE OF RIGHT BREAST AND NIPPLE: Chronic | ICD-10-CM

## 2021-08-04 DIAGNOSIS — C50.919 MALIGNANT NEOPLASM OF UNSPECIFIED SITE OF UNSPECIFIED FEMALE BREAST: ICD-10-CM

## 2021-08-04 DIAGNOSIS — Z00.8 ENCOUNTER FOR OTHER GENERAL EXAMINATION: ICD-10-CM

## 2021-08-24 ENCOUNTER — APPOINTMENT (OUTPATIENT)
Dept: OBGYN | Facility: CLINIC | Age: 76
End: 2021-08-24

## 2021-09-14 ENCOUNTER — RESULT REVIEW (OUTPATIENT)
Age: 76
End: 2021-09-14

## 2021-09-14 ENCOUNTER — APPOINTMENT (OUTPATIENT)
Dept: ULTRASOUND IMAGING | Facility: IMAGING CENTER | Age: 76
End: 2021-09-14
Payer: MEDICAID

## 2021-09-14 ENCOUNTER — APPOINTMENT (OUTPATIENT)
Dept: MAMMOGRAPHY | Facility: IMAGING CENTER | Age: 76
End: 2021-09-14
Payer: MEDICAID

## 2021-09-14 ENCOUNTER — OUTPATIENT (OUTPATIENT)
Dept: OUTPATIENT SERVICES | Facility: HOSPITAL | Age: 76
LOS: 1 days | End: 2021-09-14
Payer: MEDICAID

## 2021-09-14 DIAGNOSIS — Z90.11 ACQUIRED ABSENCE OF RIGHT BREAST AND NIPPLE: Chronic | ICD-10-CM

## 2021-09-14 DIAGNOSIS — Z00.8 ENCOUNTER FOR OTHER GENERAL EXAMINATION: ICD-10-CM

## 2021-09-14 PROCEDURE — 76641 ULTRASOUND BREAST COMPLETE: CPT | Mod: 26,LT

## 2021-09-14 PROCEDURE — G0279: CPT

## 2021-09-14 PROCEDURE — 77065 DX MAMMO INCL CAD UNI: CPT

## 2021-09-14 PROCEDURE — G0279: CPT | Mod: 26

## 2021-09-14 PROCEDURE — 77065 DX MAMMO INCL CAD UNI: CPT | Mod: 26,LT

## 2021-09-14 PROCEDURE — 76641 ULTRASOUND BREAST COMPLETE: CPT

## 2021-09-16 ENCOUNTER — APPOINTMENT (OUTPATIENT)
Dept: ENDOCRINOLOGY | Facility: CLINIC | Age: 76
End: 2021-09-16
Payer: MEDICAID

## 2021-09-16 VITALS
DIASTOLIC BLOOD PRESSURE: 71 MMHG | TEMPERATURE: 97 F | OXYGEN SATURATION: 99 % | HEART RATE: 96 BPM | WEIGHT: 145.5 LBS | HEIGHT: 58.98 IN | SYSTOLIC BLOOD PRESSURE: 128 MMHG | BODY MASS INDEX: 29.33 KG/M2

## 2021-09-16 DIAGNOSIS — M81.0 AGE-RELATED OSTEOPOROSIS W/OUT CURRENT PATHOLOGICAL FRACTURE: ICD-10-CM

## 2021-09-16 DIAGNOSIS — E78.5 HYPERLIPIDEMIA, UNSPECIFIED: ICD-10-CM

## 2021-09-16 DIAGNOSIS — E11.9 TYPE 2 DIABETES MELLITUS W/OUT COMPLICATIONS: ICD-10-CM

## 2021-09-16 DIAGNOSIS — E27.8 OTHER SPECIFIED DISORDERS OF ADRENAL GLAND: ICD-10-CM

## 2021-09-16 DIAGNOSIS — E55.9 VITAMIN D DEFICIENCY, UNSPECIFIED: ICD-10-CM

## 2021-09-16 DIAGNOSIS — Z86.79 PERSONAL HISTORY OF OTHER DISEASES OF THE CIRCULATORY SYSTEM: ICD-10-CM

## 2021-09-16 LAB — GLUCOSE BLDC GLUCOMTR-MCNC: 308

## 2021-09-16 PROCEDURE — 99214 OFFICE O/P EST MOD 30 MIN: CPT | Mod: 25

## 2021-09-16 RX ORDER — REPAGLINIDE 0.5 MG/1
0.5 TABLET ORAL 3 TIMES DAILY
Qty: 270 | Refills: 2 | Status: COMPLETED | COMMUNITY
Start: 2021-02-09 | End: 2021-09-16

## 2021-09-16 NOTE — PHYSICAL EXAM
[Alert] : alert [Well Nourished] : well nourished [Healthy Appearance] : healthy appearance [No Acute Distress] : no acute distress [Well Developed] : well developed [Normal Voice/Communication] : normal voice communication [Normal Sclera/Conjunctiva] : normal sclera/conjunctiva [No Proptosis] : no proptosis [No Neck Mass] : no neck mass was observed [No LAD] : no lymphadenopathy [Supple] : the neck was supple [Thyroid Not Enlarged] : the thyroid was not enlarged [No Thyroid Nodules] : no palpable thyroid nodules [No Respiratory Distress] : no respiratory distress [No Accessory Muscle Use] : no accessory muscle use [Normal Rate and Effort] : normal respiratory rate and effort [Normal Rate] : heart rate was normal [No Edema] : no peripheral edema [Pedal Pulses Normal] : the pedal pulses are present [No Stigmata of Cushings Syndrome] : no stigmata of Cushings Syndrome [Not Distended] : not distended [Normal Gait] : normal gait [No Clubbing, Cyanosis] : no clubbing  or cyanosis of the fingernails [No Involuntary Movements] : no involuntary movements were seen [Acanthosis Nigricans] : acanthosis nigricans present [Right Foot Was Examined] : right foot ~C was examined [Left Foot Was Examined] : left foot ~C was examined [Normal] : normal [2+] : 2+ in the dorsalis pedis [Diminished Throughout Both Feet] : normal tactile sensation with monofilament testing throughout both feet [No Tremors] : no tremors [Normal Sensation on Monofilament Testing] : normal sensation on monofilament testing of lower extremities [Normal Affect] : the affect was normal [Normal Insight/Judgement] : insight and judgment were intact [Normal Mood] : the mood was normal

## 2021-09-16 NOTE — HISTORY OF PRESENT ILLNESS
[FreeTextEntry1] : Patient is here with her daughter who provides information.\par CC: Diabetes\par This is a 76-year-old female with type 2 diabetes mellitus, hypertension, hyperlipidemia, right 1.3 cm adrenal nodule, left ill-defined adrenal thickening, vitamin D insufficiency, osteoporosis, breast cancer status post right lumpectomy, RT, right mastectomy, GERD, here for follow-up.  She completed chemotherapy approximately 2 months ago.\par Type 2 diabetes was diagnosed at the age of 60.  She is currently on Metformin 850 mg 2 times a day and glimepiride 4 mg daily.  She self monitors blood glucose 1 time a day.  Fasting 140–180s.  She denies hypoglycemia.\par Her last ophthalmology exam was September 2020 and she denies retinopathy.\par She denies neuropathy.\par She denies nephropathy.\par She is on atorvastatin 40 mg daily.\par \par CT from December 22, 2020 showed right 1.3 cm adrenal nodule and ill-defined thickening noted on the left adrenal gland.\par \par For osteoporosis, she was on alendronate for a total of 5 years.  She discontinued alendronate in December 2020.  She takes vitamin D 2000 IU daily.\par

## 2021-09-16 NOTE — ASSESSMENT
[FreeTextEntry1] : This is a 76-year-old female with type 2 diabetes mellitus, hypertension, hyperlipidemia, right 1.3 cm adrenal nodule, left ill-defined adrenal thickening, vitamin D insufficiency, osteoporosis, breast cancer status post right lumpectomy, RT, right mastectomy, GERD, here for follow up.\par 1.  T2DM\par Check hemoglobin A1c.  Continue Metformin and glimepiride for now.\par Her last ophthalmology exam was a year ago and she denies retinopathy.  Advised to make a follow-up appointment.\par She denies neuropathy.\par She denies nephropathy.  Check urine microalbumin.\par She is on atorvastatin 40 mg daily.  Check lipid panel.\par 2.  Hypertension\par Controlled.\par 3.  Hyperlipidemia\par Check lipid panel.  Continue atorvastatin 40 mg daily pending results.\par 4.  Right 1.3 cm adrenal nodule/left ill-defined adrenal thickening\par Will check for hormonal hyperfunction.\par Check plasma free metanephrines, renin,  aldosterone, DHEA-S.\par Check 24-hour urine cortisol.\par CT abdomen pelvis from July 22, 2021 showed right 1.6 cm indeterminant adrenal nodule (previously 1.3 cm).  Check CT adrenal protocol to further characterize right adrenal mass.\par 5.  Vitamin D insufficiency\par Check 25 vitamin D.\par 6.  Osteoporosis\par Check DEXA.\par

## 2021-09-21 ENCOUNTER — LABORATORY RESULT (OUTPATIENT)
Age: 76
End: 2021-09-21

## 2021-09-21 ENCOUNTER — OUTPATIENT (OUTPATIENT)
Dept: OUTPATIENT SERVICES | Facility: HOSPITAL | Age: 76
LOS: 1 days | End: 2021-09-21
Payer: MEDICAID

## 2021-09-21 ENCOUNTER — APPOINTMENT (OUTPATIENT)
Dept: OBGYN | Facility: CLINIC | Age: 76
End: 2021-09-21
Payer: MEDICAID

## 2021-09-21 VITALS — DIASTOLIC BLOOD PRESSURE: 70 MMHG | SYSTOLIC BLOOD PRESSURE: 110 MMHG | WEIGHT: 144 LBS | BODY MASS INDEX: 29.1 KG/M2

## 2021-09-21 DIAGNOSIS — N76.0 ACUTE VAGINITIS: ICD-10-CM

## 2021-09-21 DIAGNOSIS — Z90.11 ACQUIRED ABSENCE OF RIGHT BREAST AND NIPPLE: Chronic | ICD-10-CM

## 2021-09-21 DIAGNOSIS — Z00.00 ENCOUNTER FOR GENERAL ADULT MEDICAL EXAMINATION W/OUT ABNORMAL FINDINGS: ICD-10-CM

## 2021-09-21 DIAGNOSIS — Z01.419 ENCOUNTER FOR GYNECOLOGICAL EXAMINATION (GENERAL) (ROUTINE) W/OUT ABNORMAL FINDINGS: ICD-10-CM

## 2021-09-21 PROCEDURE — G0438: CPT

## 2021-09-21 PROCEDURE — 99387 INIT PM E/M NEW PAT 65+ YRS: CPT

## 2021-09-21 PROCEDURE — 87591 N.GONORRHOEAE DNA AMP PROB: CPT

## 2021-09-21 PROCEDURE — 87491 CHLMYD TRACH DNA AMP PROBE: CPT

## 2021-09-21 PROCEDURE — 87624 HPV HI-RISK TYP POOLED RSLT: CPT

## 2021-09-22 DIAGNOSIS — Z01.419 ENCOUNTER FOR GYNECOLOGICAL EXAMINATION (GENERAL) (ROUTINE) WITHOUT ABNORMAL FINDINGS: ICD-10-CM

## 2021-09-22 LAB
25(OH)D3 SERPL-MCNC: 21.6 NG/ML
ALBUMIN SERPL ELPH-MCNC: 4.4 G/DL
ALDOSTERONE SERUM: 10.9 NG/DL
ALP BLD-CCNC: 80 U/L
ALT SERPL-CCNC: 17 U/L
ANION GAP SERPL CALC-SCNC: 17 MMOL/L
AST SERPL-CCNC: 16 U/L
BASOPHILS # BLD AUTO: 0.03 K/UL
BASOPHILS NFR BLD AUTO: 0.4 %
BILIRUB SERPL-MCNC: 0.2 MG/DL
BUN SERPL-MCNC: 19 MG/DL
C TRACH RRNA SPEC QL NAA+PROBE: SIGNIFICANT CHANGE UP
CALCIUM SERPL-MCNC: 10.8 MG/DL
CALCIUM SERPL-MCNC: 11 MG/DL
CHLORIDE SERPL-SCNC: 98 MMOL/L
CHOLEST SERPL-MCNC: 142 MG/DL
CO2 SERPL-SCNC: 23 MMOL/L
CORTIS SERPL-MCNC: 17.5 UG/DL
CREAT SERPL-MCNC: 0.83 MG/DL
CREAT SPEC-SCNC: 40 MG/DL
DHEA-S SERPL-MCNC: 163 UG/DL
DOPAMINE UR-MCNC: 36 PG/ML
EOSINOPHIL # BLD AUTO: 0.19 K/UL
EOSINOPHIL NFR BLD AUTO: 2.7 %
EPINEPH UR-MCNC: <15 PG/ML
ESTIMATED AVERAGE GLUCOSE: 192 MG/DL
FRUCTOSAMINE SERPL-MCNC: 301 UMOL/L
GLUCOSE SERPL-MCNC: 300 MG/DL
HBA1C MFR BLD HPLC: 8.3 %
HCT VFR BLD CALC: 36.3 %
HDLC SERPL-MCNC: 52 MG/DL
HGB BLD-MCNC: 11.4 G/DL
HPV HIGH+LOW RISK DNA PNL CVX: SIGNIFICANT CHANGE UP
IMM GRANULOCYTES NFR BLD AUTO: 0.3 %
LDLC SERPL CALC-MCNC: 56 MG/DL
LYMPHOCYTES # BLD AUTO: 1.79 K/UL
LYMPHOCYTES NFR BLD AUTO: 25.6 %
MAN DIFF?: NORMAL
MCHC RBC-ENTMCNC: 28.4 PG
MCHC RBC-ENTMCNC: 31.4 GM/DL
MCV RBC AUTO: 90.5 FL
MICROALBUMIN 24H UR DL<=1MG/L-MCNC: <1.2 MG/DL
MICROALBUMIN/CREAT 24H UR-RTO: NORMAL MG/G
MONOCYTES # BLD AUTO: 0.54 K/UL
MONOCYTES NFR BLD AUTO: 7.7 %
N GONORRHOEA RRNA SPEC QL NAA+PROBE: SIGNIFICANT CHANGE UP
NEUTROPHILS # BLD AUTO: 4.43 K/UL
NEUTROPHILS NFR BLD AUTO: 63.3 %
NONHDLC SERPL-MCNC: 90 MG/DL
NOREPINEPH UR-MCNC: 275 PG/ML
PARATHYROID HORMONE INTACT: 14 PG/ML
PLATELET # BLD AUTO: 169 K/UL
POTASSIUM SERPL-SCNC: 4.6 MMOL/L
PROT SERPL-MCNC: 7.1 G/DL
RBC # BLD: 4.01 M/UL
RBC # FLD: 14.6 %
SODIUM SERPL-SCNC: 138 MMOL/L
SPECIMEN SOURCE: SIGNIFICANT CHANGE UP
TRIGL SERPL-MCNC: 170 MG/DL
VIT B12 SERPL-MCNC: 442 PG/ML
WBC # FLD AUTO: 7 K/UL

## 2021-09-24 LAB
METANEPHRINE, PL: 16.9 PG/ML
NORMETANEPHRINE, PL: 113.9 PG/ML
RENIN ACTIVITY, PLASMA: 3.35 NG/ML/HR

## 2021-09-25 LAB — CYTOLOGY SPEC DOC CYTO: SIGNIFICANT CHANGE UP

## 2021-09-27 ENCOUNTER — APPOINTMENT (OUTPATIENT)
Dept: RADIOLOGY | Facility: IMAGING CENTER | Age: 76
End: 2021-09-27
Payer: MEDICAID

## 2021-09-27 ENCOUNTER — APPOINTMENT (OUTPATIENT)
Dept: CT IMAGING | Facility: IMAGING CENTER | Age: 76
End: 2021-09-27
Payer: MEDICAID

## 2021-09-27 ENCOUNTER — OUTPATIENT (OUTPATIENT)
Dept: OUTPATIENT SERVICES | Facility: HOSPITAL | Age: 76
LOS: 1 days | End: 2021-09-27
Payer: MEDICAID

## 2021-09-27 DIAGNOSIS — Z00.8 ENCOUNTER FOR OTHER GENERAL EXAMINATION: ICD-10-CM

## 2021-09-27 DIAGNOSIS — Z90.11 ACQUIRED ABSENCE OF RIGHT BREAST AND NIPPLE: Chronic | ICD-10-CM

## 2021-09-27 PROCEDURE — 77080 DXA BONE DENSITY AXIAL: CPT | Mod: 26

## 2021-09-27 PROCEDURE — 74170 CT ABD WO CNTRST FLWD CNTRST: CPT

## 2021-09-27 PROCEDURE — 77080 DXA BONE DENSITY AXIAL: CPT

## 2021-09-27 PROCEDURE — 74170 CT ABD WO CNTRST FLWD CNTRST: CPT | Mod: 26

## 2021-09-28 ENCOUNTER — NON-APPOINTMENT (OUTPATIENT)
Age: 76
End: 2021-09-28

## 2021-09-28 LAB
CORTIS 24H UR-MCNC: 24 H
CORTIS 24H UR-MRATE: 13 MCG/24 H
SPECIMEN VOL 24H UR: 2350 ML

## 2021-10-01 ENCOUNTER — NON-APPOINTMENT (OUTPATIENT)
Age: 76
End: 2021-10-01

## 2021-11-02 ENCOUNTER — APPOINTMENT (OUTPATIENT)
Dept: INTERNAL MEDICINE | Facility: CLINIC | Age: 76
End: 2021-11-02

## 2021-11-08 ENCOUNTER — OUTPATIENT (OUTPATIENT)
Dept: OUTPATIENT SERVICES | Facility: HOSPITAL | Age: 76
LOS: 1 days | Discharge: HOME | End: 2021-11-08

## 2021-11-08 DIAGNOSIS — Z90.11 ACQUIRED ABSENCE OF RIGHT BREAST AND NIPPLE: Chronic | ICD-10-CM

## 2021-11-08 DIAGNOSIS — C50.919 MALIGNANT NEOPLASM OF UNSPECIFIED SITE OF UNSPECIFIED FEMALE BREAST: ICD-10-CM

## 2021-11-19 ENCOUNTER — APPOINTMENT (OUTPATIENT)
Dept: HEMATOLOGY ONCOLOGY | Facility: CLINIC | Age: 76
End: 2021-11-19

## 2022-05-06 NOTE — PHYSICAL EXAM
Follow up with your doctor in 3-5 days or sooner if needed.    Go to ER    Patient Education     Candida Skin Infection (Adult)   Candida is a type of yeast. It grows naturally on the skin and in the mouth. If it grows out of control, it can cause an infection. Candida can cause infections in the genital area, skin folds, in the mouth, and under the breasts. Anyone can get this infection. It is more common in a person with a weak immune system, such as from diabetes, HIV, or cancer. It’s also more common in someone who has been on antibiotic therapy. And it’s more common in people who are overweight or who have incontinence. Wearing tight-fitting clothing and taking part in activities with lots of skin-to-skin contact can also put you at risk.  Candida causes the skin to become bright red and inflamed. The border of the infected part of the skin is often raised. The infection causes pain and itching. Sometimes the skin peels and bleeds. In the mouth, candida is called thrush, and may cause white thickened areas.  A Candida rash is most often treated with an antifungal cream, gel, or powder. . The rash will clear a few days after starting the medicine. Infections that don’t go away may need a prescription medicine. In rare cases, a bacterial infection can also occur.  Home care  Your healthcare provider will advise using an antifungal cream, powder, or gel for the rash. He or she may also prescribe a medicine for the itch. Follow all instructions for using these medicines.  General care  · Keep your skin clean by washing the area twice a day.  · Use the medicine as directed until your rash is gone. Once the skin has healed, keep it dry to prevent another infection.   · If you are overweight, talk with your healthcare provider about a plan to lose extra weight.  · Don't wear tight-fitting clothes.    Follow-up care  Follow up with your healthcare provider, or as advised. Your rash will clear in 7 to 14 days. Call your  provider if the rash is not gone after 14 days.  When to get medical advice  Call your healthcare provider right away if any of these occur:  · Pain or redness that gets worse or spreads  · Fluid coming from the skin  · Yellow crusts on the skin  · Fever of 100.4°F (38°C) or higher, or as directed by your provider  [employer] last reviewed this educational content on 10/1/2019  © 6194-7922 [employer]. All rights reserved. This information is not intended as a substitute for professional medical care. Always follow your healthcare professional's instructions.            [Restricted in physically strenuous activity but ambulatory and able to carry out work of a light or sedentary nature] : Status 1- Restricted in physically strenuous activity but ambulatory and able to carry out work of a light or sedentary nature, e.g., light house work, office work [Normal] : affect appropriate [de-identified] : Right mastectomy site is healing well with hyperpigmentation and dry skin noted along suture line.no s/s of infection noted

## 2022-05-25 ENCOUNTER — APPOINTMENT (OUTPATIENT)
Dept: ENDOCRINOLOGY | Facility: CLINIC | Age: 77
End: 2022-05-25

## 2022-07-07 ENCOUNTER — OUTPATIENT (OUTPATIENT)
Dept: OUTPATIENT SERVICES | Facility: HOSPITAL | Age: 77
LOS: 1 days | Discharge: HOME | End: 2022-07-07

## 2022-07-07 DIAGNOSIS — Z90.11 ACQUIRED ABSENCE OF RIGHT BREAST AND NIPPLE: Chronic | ICD-10-CM

## 2022-07-07 DIAGNOSIS — C50.919 MALIGNANT NEOPLASM OF UNSPECIFIED SITE OF UNSPECIFIED FEMALE BREAST: ICD-10-CM

## 2022-07-15 ENCOUNTER — APPOINTMENT (OUTPATIENT)
Dept: HEMATOLOGY ONCOLOGY | Facility: CLINIC | Age: 77
End: 2022-07-15

## 2022-07-15 VITALS
OXYGEN SATURATION: 97 % | SYSTOLIC BLOOD PRESSURE: 113 MMHG | RESPIRATION RATE: 16 BRPM | BODY MASS INDEX: 29.78 KG/M2 | HEIGHT: 58.98 IN | DIASTOLIC BLOOD PRESSURE: 69 MMHG | TEMPERATURE: 97.3 F | WEIGHT: 147.71 LBS | HEART RATE: 94 BPM

## 2022-07-15 PROCEDURE — 99213 OFFICE O/P EST LOW 20 MIN: CPT

## 2022-07-15 RX ORDER — DEXAMETHASONE 4 MG/1
4 TABLET ORAL
Qty: 30 | Refills: 0 | Status: DISCONTINUED | COMMUNITY
Start: 2021-01-06 | End: 2022-07-15

## 2022-07-15 NOTE — HISTORY OF PRESENT ILLNESS
[T: ___] : T[unfilled] [N: ___] : N[unfilled] [AJCC Stage: ____] : AJCC Stage: [unfilled] [de-identified] : 74 y/o female who presents for initial breast medical oncology consultation.\par \par The patient has a history of right breast IDC, ER/MT+, HER2-, [id number], diagnosed in 2015.  The patient underwent a right lumpectomy with SLNB at [facility] with negative margins, 5/5 negative LNs and low Oncotype score.  She was followed by radiation therapy completed 3/12/2015.  The patient resided in [facility] and took Arimidex for 5 years, completed 1/2020. Dr Linares \par \par She was followed with routine mammograms annually.  Mammogram in [facility] 3/2020- abnormal. Could not get care due to the pandemic. [place] and underwent imaging on 10/7/20. A right breast mass at the 8:00 axis, 7 cm from the nipple was discovered and biopsy was recommended.  The biopsy was done on 10/29/20 and pathology revealed an invasive ductal carcinoma, grade 3 ER/MT negative, HER2 IHC: negative.  \par \par The patient underwent a right mastectomy with SLNB by Dr. Nidhi Wilks from [facility] on  12/1/20.  Surgical pathology showed: Invasive ductal carcinoma   (1.7 cm, microscopic measurement), grade 3. No angiolymphatic or perineural invasion identified. Tumor is 3 cm from posterior margin. Monckaberg's arteriosclerosis.  No lymph nodes were discovered during surgery, therefore none submitted.\par \par Pertinent History:\par PMD: Dr. Cox\par PMH: HTN, DM\par Osteoporosis on Alendronate \par The patient takes gabapentin from hip pain\par FH: Daughter with history of sarcoma of the leg; Another daughter history of uterine cancer.  \par The patient is from [facility], she is a , has three children and lives with her daughter and son-in- law\par \par Grand daughter felt communication was very difficult with [facility] and switched care to [facility]. \par \par Fosamax stopped after 5 years\par Patient is very active, spending most day doing chores. Lately has been getting tired easily. Uses cane due to arthritis pain. Independent in ADL \par  [de-identified] : Ms. ROSEANN LU  is here for a follow up appt for right breast TNBC dx 10/2020, s/p right mastectomy. 1/6/2021 started adjuvant dd CMF chemo q2 weeks. Switched to Q3w treatment for better tolerance and enough time for count recovery. She developed severe neutropenia after C3, dose reduce by 20%  with C4. Completed C8 6/9/21\par Pt is here with her dtr \par She reports chemo s/e are resolved. PICC removed. Appetite better, eating well, good energy and wt stable\par CT scans 12/2020 reviewed. Adrenal thickening is non specific. Bone scan MARIE 12/2020\par Patient had imaging done on 7/14/21 Upon review of CT films, my interpretation is that patient has stable adrenal thickening/nodule. No concern for mets. She is referred to GYN for f/u endometrial findings on CT. I reviewed these findings with the patient\par mammo/sono 6/2022 birads 3\par She spends most of her time in [facility], prefers to see me once a year

## 2022-07-15 NOTE — ASSESSMENT
[FreeTextEntry1] : In summary, Ms. ROSEANN LU is a 75 year old female with history of stage I right breast infiltrating ductal carcinoma, ER/RI positive and HER-2 negative diagnosed in 2015.  Oncotype score was 11.  She underwent breast conservation surgery, radiation and completed anastrozole for 5 years.  Now she is diagnosed with right breast new primary.  She underwent right mastectomy on 12/1/2020 which showed a poorly differentiated 1.7cm invasive ductal carcinoma. T1c Nx Mx Stage IA.  Lymph nodes were not detected due to previous history of sentinel lymph node dissection.  Tumor was triple negative. Patient is elderly, with well-managed comorbidities and overall good performance status.  \par \par - Breast ca: Patient has completed adjuvant CMF chemotherapy x8 cycles 6/2021. doing well\par annual breast imaging reviewed- \par 7/20222 left M/S: Birads 3, 6 m f/u sono rec\par she has no sx of recurrence\par rec age appropriate malignancy screen- colonoscopy and pap\par BW today\par \par RTO every 3m \par

## 2022-07-15 NOTE — PHYSICAL EXAM
[Restricted in physically strenuous activity but ambulatory and able to carry out work of a light or sedentary nature] : Status 1- Restricted in physically strenuous activity but ambulatory and able to carry out work of a light or sedentary nature, e.g., light house work, office work [Normal] : affect appropriate [de-identified] : PICC line removed [de-identified] : Right mastectomy site is healed well, no chest wall or axillary nodules

## 2022-08-10 ENCOUNTER — NON-APPOINTMENT (OUTPATIENT)
Age: 77
End: 2022-08-10

## 2022-10-31 ENCOUNTER — APPOINTMENT (OUTPATIENT)
Dept: RHEUMATOLOGY | Facility: CLINIC | Age: 77
End: 2022-10-31

## 2022-10-31 VITALS
RESPIRATION RATE: 16 BRPM | TEMPERATURE: 98 F | SYSTOLIC BLOOD PRESSURE: 117 MMHG | DIASTOLIC BLOOD PRESSURE: 71 MMHG | WEIGHT: 145 LBS | BODY MASS INDEX: 30.44 KG/M2 | HEIGHT: 58 IN | HEART RATE: 86 BPM | OXYGEN SATURATION: 95 %

## 2022-10-31 DIAGNOSIS — M25.562 PAIN IN RIGHT KNEE: ICD-10-CM

## 2022-10-31 DIAGNOSIS — M25.561 PAIN IN RIGHT KNEE: ICD-10-CM

## 2022-10-31 PROCEDURE — 99072 ADDL SUPL MATRL&STAF TM PHE: CPT

## 2022-10-31 PROCEDURE — 99205 OFFICE O/P NEW HI 60 MIN: CPT

## 2022-10-31 NOTE — ASSESSMENT
[FreeTextEntry1] : 78 y/o F w b/l knee pain\par =b/l knee pain\par =worse in pm, w activities\par =no swelling, stiffness\par \par Most likely OA. Will r/o inflammatory/AI dz. \par \par Plan\par -Labs w serologies, inflammatory markers\par -Xrays knees\par RTO depending on above results

## 2022-10-31 NOTE — CONSULT LETTER
[Dear  ___] : Dear  [unfilled], [Consult Letter:] : I had the pleasure of evaluating your patient, [unfilled]. [Consult Closing:] : Thank you very much for allowing me to participate in the care of this patient.  If you have any questions, please do not hesitate to contact me. [Sincerely,] : Sincerely, [FreeTextEntry2] : Dr. Kenny Hernández \par [address] [facility],\[place] [address] [FreeTextEntry3] : Jasper Robles MD

## 2022-10-31 NOTE — HISTORY OF PRESENT ILLNESS
[FreeTextEntry1] : Referring physician: Dr. Kenny Hernández ([facility]) \par \par 78 y/o F here for consultation. \par \par Pt reports pain in b/l knees. Pt has had this pain for years. \par Pain worse in pm. Difficult w activities. \par No swelling, no stiffness. \par Pt has had steroid injections in b/l knees. Pt has also has had gel injections. Pt says it has helped in past. \par \par No fevers, h/a, rashes, hair loss, oral ulcers, epistaxis, sinusitis,  swollen glands, dry mouth, dry eyes, CP, SOB, cough, vision changes, abdominal pain, GERD, n/v/d, blood in stool or urine, focal weakness, sensory loss,  Raynaud's, joint pain, swelling, weight loss.

## 2022-11-09 ENCOUNTER — NON-APPOINTMENT (OUTPATIENT)
Age: 77
End: 2022-11-09

## 2022-11-18 LAB
ALBUMIN SERPL ELPH-MCNC: 4.1 G/DL
ALP BLD-CCNC: 79 U/L
ALT SERPL-CCNC: 21 U/L
ANION GAP SERPL CALC-SCNC: 12 MMOL/L
AST SERPL-CCNC: 18 U/L
BASOPHILS # BLD AUTO: 0.04 K/UL
BASOPHILS NFR BLD AUTO: 0.5 %
BILIRUB SERPL-MCNC: 0.2 MG/DL
BUN SERPL-MCNC: 12 MG/DL
CALCIUM SERPL-MCNC: 9.5 MG/DL
CCP AB SER IA-ACNC: <8 UNITS
CHLORIDE SERPL-SCNC: 109 MMOL/L
CO2 SERPL-SCNC: 22 MMOL/L
CREAT SERPL-MCNC: 0.68 MG/DL
CRP SERPL-MCNC: 6 MG/L
EGFR: 90 ML/MIN/1.73M2
EOSINOPHIL # BLD AUTO: 0.09 K/UL
EOSINOPHIL NFR BLD AUTO: 1.1 %
ERYTHROCYTE [SEDIMENTATION RATE] IN BLOOD BY WESTERGREN METHOD: 69 MM/HR
G6PD SER-CCNC: 15.5 U/G HGB
GLUCOSE SERPL-MCNC: 116 MG/DL
HCT VFR BLD CALC: 34.5 %
HGB BLD-MCNC: 10.8 G/DL
IMM GRANULOCYTES NFR BLD AUTO: 0.4 %
LYMPHOCYTES # BLD AUTO: 2.2 K/UL
LYMPHOCYTES NFR BLD AUTO: 26.3 %
MAN DIFF?: NORMAL
MCHC RBC-ENTMCNC: 28.1 PG
MCHC RBC-ENTMCNC: 31.3 GM/DL
MCV RBC AUTO: 89.8 FL
MONOCYTES # BLD AUTO: 0.46 K/UL
MONOCYTES NFR BLD AUTO: 5.5 %
NEUTROPHILS # BLD AUTO: 5.53 K/UL
NEUTROPHILS NFR BLD AUTO: 66.2 %
PLATELET # BLD AUTO: 170 K/UL
POTASSIUM SERPL-SCNC: 4.3 MMOL/L
PROT SERPL-MCNC: 7.3 G/DL
RBC # BLD: 3.84 M/UL
RBC # FLD: 15.8 %
RF+CCP IGG SER-IMP: NEGATIVE
SODIUM SERPL-SCNC: 143 MMOL/L
WBC # FLD AUTO: 8.35 K/UL

## 2022-11-22 ENCOUNTER — APPOINTMENT (OUTPATIENT)
Dept: RHEUMATOLOGY | Facility: CLINIC | Age: 77
End: 2022-11-22

## 2022-11-22 VITALS
HEART RATE: 105 BPM | SYSTOLIC BLOOD PRESSURE: 109 MMHG | TEMPERATURE: 97.9 F | HEIGHT: 58 IN | BODY MASS INDEX: 30.64 KG/M2 | WEIGHT: 146 LBS | DIASTOLIC BLOOD PRESSURE: 71 MMHG | OXYGEN SATURATION: 98 % | RESPIRATION RATE: 15 BRPM

## 2022-11-22 DIAGNOSIS — M19.90 UNSPECIFIED OSTEOARTHRITIS, UNSPECIFIED SITE: ICD-10-CM

## 2022-11-22 PROCEDURE — 99072 ADDL SUPL MATRL&STAF TM PHE: CPT

## 2022-11-22 PROCEDURE — 20610 DRAIN/INJ JOINT/BURSA W/O US: CPT | Mod: 50

## 2022-11-22 RX ORDER — METHYLPRED ACET/NACL,ISO-OS/PF 40 MG/ML
40 VIAL (ML) INJECTION
Qty: 1 | Refills: 0 | Status: COMPLETED | OUTPATIENT
Start: 2022-11-22

## 2022-11-22 RX ADMIN — METHYLPREDNISOLONE ACETATE 0 MG/ML: 40 INJECTION, SUSPENSION INTRA-ARTICULAR; INTRALESIONAL; INTRAMUSCULAR; INTRASYNOVIAL; SOFT TISSUE at 00:00

## 2022-11-22 NOTE — PROCEDURE
[Today's Date:] : Date: [unfilled] [Patient] : the patient [Risks] : risks [Benefits] : benefits [Consent Obtained] : written consent was obtained prior to the procedure and is detailed in the patient's record [Therapeutic] : therapeutic [#1 Site: ______] : #1 site identified in the [unfilled] [Ethyl Chloride] : ethyl chloride [Chlorhexidine] : chlorhexidine [22 gauge 1.5 inch] : A 22 gauge 1.5 inch needle was used [Depomedrol ___ mg] : Depomedrol [unfilled] mg [#2 Site: ___] : # 2 site identified in the [unfilled]

## 2022-12-27 ENCOUNTER — APPOINTMENT (OUTPATIENT)
Dept: RHEUMATOLOGY | Facility: CLINIC | Age: 77
End: 2022-12-27

## 2023-03-10 ENCOUNTER — APPOINTMENT (OUTPATIENT)
Dept: ULTRASOUND IMAGING | Facility: CLINIC | Age: 78
End: 2023-03-10
Payer: COMMERCIAL

## 2023-03-10 ENCOUNTER — RESULT REVIEW (OUTPATIENT)
Age: 78
End: 2023-03-10

## 2023-03-10 ENCOUNTER — OUTPATIENT (OUTPATIENT)
Dept: OUTPATIENT SERVICES | Facility: HOSPITAL | Age: 78
LOS: 1 days | End: 2023-03-10
Payer: MEDICARE

## 2023-03-10 DIAGNOSIS — C50.919 MALIGNANT NEOPLASM OF UNSPECIFIED SITE OF UNSPECIFIED FEMALE BREAST: ICD-10-CM

## 2023-03-10 DIAGNOSIS — Z90.11 ACQUIRED ABSENCE OF RIGHT BREAST AND NIPPLE: Chronic | ICD-10-CM

## 2023-03-10 PROCEDURE — 76641 ULTRASOUND BREAST COMPLETE: CPT | Mod: 26,LT

## 2023-03-10 PROCEDURE — 76641 ULTRASOUND BREAST COMPLETE: CPT

## 2023-07-07 ENCOUNTER — OUTPATIENT (OUTPATIENT)
Dept: OUTPATIENT SERVICES | Facility: HOSPITAL | Age: 78
LOS: 1 days | Discharge: HOME | End: 2023-07-07

## 2023-07-07 DIAGNOSIS — Z90.11 ACQUIRED ABSENCE OF RIGHT BREAST AND NIPPLE: Chronic | ICD-10-CM

## 2023-07-07 DIAGNOSIS — C50.919 MALIGNANT NEOPLASM OF UNSPECIFIED SITE OF UNSPECIFIED FEMALE BREAST: ICD-10-CM

## 2023-07-10 ENCOUNTER — APPOINTMENT (OUTPATIENT)
Dept: MAMMOGRAPHY | Facility: IMAGING CENTER | Age: 78
End: 2023-07-10

## 2023-07-14 ENCOUNTER — APPOINTMENT (OUTPATIENT)
Dept: HEMATOLOGY ONCOLOGY | Facility: CLINIC | Age: 78
End: 2023-07-14

## 2023-07-20 ENCOUNTER — APPOINTMENT (OUTPATIENT)
Dept: MAMMOGRAPHY | Facility: IMAGING CENTER | Age: 78
End: 2023-07-20
Payer: MEDICARE

## 2023-07-20 ENCOUNTER — RESULT REVIEW (OUTPATIENT)
Age: 78
End: 2023-07-20

## 2023-07-20 ENCOUNTER — OUTPATIENT (OUTPATIENT)
Dept: OUTPATIENT SERVICES | Facility: HOSPITAL | Age: 78
LOS: 1 days | End: 2023-07-20
Payer: MEDICARE

## 2023-07-20 ENCOUNTER — APPOINTMENT (OUTPATIENT)
Dept: ULTRASOUND IMAGING | Facility: IMAGING CENTER | Age: 78
End: 2023-07-20
Payer: MEDICARE

## 2023-07-20 DIAGNOSIS — C50.919 MALIGNANT NEOPLASM OF UNSPECIFIED SITE OF UNSPECIFIED FEMALE BREAST: ICD-10-CM

## 2023-07-20 DIAGNOSIS — Z00.8 ENCOUNTER FOR OTHER GENERAL EXAMINATION: ICD-10-CM

## 2023-07-20 DIAGNOSIS — Z90.11 ACQUIRED ABSENCE OF RIGHT BREAST AND NIPPLE: Chronic | ICD-10-CM

## 2023-07-20 PROCEDURE — 76641 ULTRASOUND BREAST COMPLETE: CPT

## 2023-07-20 PROCEDURE — 77065 DX MAMMO INCL CAD UNI: CPT | Mod: 26,LT

## 2023-07-20 PROCEDURE — 77065 DX MAMMO INCL CAD UNI: CPT

## 2023-07-20 PROCEDURE — G0279: CPT | Mod: 26

## 2023-07-20 PROCEDURE — G0279: CPT

## 2023-07-20 PROCEDURE — 76641 ULTRASOUND BREAST COMPLETE: CPT | Mod: 26,LT

## 2023-09-06 ENCOUNTER — APPOINTMENT (OUTPATIENT)
Dept: RHEUMATOLOGY | Facility: CLINIC | Age: 78
End: 2023-09-06
Payer: COMMERCIAL

## 2023-09-06 VITALS
WEIGHT: 144 LBS | RESPIRATION RATE: 16 BRPM | BODY MASS INDEX: 30.23 KG/M2 | HEIGHT: 58 IN | OXYGEN SATURATION: 97 % | SYSTOLIC BLOOD PRESSURE: 144 MMHG | HEART RATE: 99 BPM | DIASTOLIC BLOOD PRESSURE: 72 MMHG | TEMPERATURE: 98 F

## 2023-09-06 PROCEDURE — 99214 OFFICE O/P EST MOD 30 MIN: CPT

## 2023-09-06 NOTE — ASSESSMENT
[FreeTextEntry1] : 79 y/o F w OA, seronegative RA?  =b/l knee pain =worse in pm, w activities =no swelling, stiffness =labs 10/22 w high ESR, but negative RF, CCP  =xrays 10/22 w OA  Pt w definite OA, but unsure of RA...however, pt responded to ssz. Will continue.  Pt counseled she needs more regular f/u to monitor response to therapy and toxicities than yearly visits. Pt agreed.   Plan -c/w ssz 500mg BID  RTO 6 months

## 2023-09-06 NOTE — PHYSICAL EXAM
[General Appearance - Well Nourished] : well nourished [General Appearance - Well Developed] : well developed [Sclera] : the sclera and conjunctiva were normal [Auscultation Breath Sounds / Voice Sounds] : lungs were clear to auscultation bilaterally [Heart Sounds] : normal S1 and S2 [Heart Sounds Gallop] : no gallops [Murmurs] : no murmurs [Heart Sounds Pericardial Friction Rub] : no pericardial rub [Musculoskeletal - Swelling] : no joint swelling seen [] : no rash [Skin Lesions] : no skin lesions [Oriented To Time, Place, And Person] : oriented to person, place, and time

## 2023-09-06 NOTE — HISTORY OF PRESENT ILLNESS
[___ Month(s) Ago] : [unfilled] month(s) ago [FreeTextEntry1] : =pt stop taking ssz 500mg BID =pt says while taking it it was helping her  =pt says she has moderate pain in knees, and sometimes feels tingling; worst in pm  =no fevers, SOB, CP, abdominal pain, n/v/d, rashes

## 2023-09-06 NOTE — DATA REVIEWED
[FreeTextEntry1] : Labs reviewed 8/23 CBC, CMP wnl   Labs reviewed from 10/22 Hbg 10.8 ESR 69, CRP 6  Xrays of knees 11/22 OA

## 2023-10-30 ENCOUNTER — OUTPATIENT (OUTPATIENT)
Dept: OUTPATIENT SERVICES | Facility: HOSPITAL | Age: 78
LOS: 1 days | Discharge: HOME | End: 2023-10-30

## 2023-10-30 DIAGNOSIS — Z90.11 ACQUIRED ABSENCE OF RIGHT BREAST AND NIPPLE: Chronic | ICD-10-CM

## 2023-10-30 DIAGNOSIS — C50.919 MALIGNANT NEOPLASM OF UNSPECIFIED SITE OF UNSPECIFIED FEMALE BREAST: ICD-10-CM

## 2023-11-03 ENCOUNTER — APPOINTMENT (OUTPATIENT)
Dept: HEMATOLOGY ONCOLOGY | Facility: CLINIC | Age: 78
End: 2023-11-03
Payer: COMMERCIAL

## 2023-11-03 VITALS
HEART RATE: 101 BPM | OXYGEN SATURATION: 97 % | WEIGHT: 143.3 LBS | TEMPERATURE: 97 F | BODY MASS INDEX: 29.95 KG/M2 | DIASTOLIC BLOOD PRESSURE: 76 MMHG | RESPIRATION RATE: 16 BRPM | SYSTOLIC BLOOD PRESSURE: 131 MMHG

## 2023-11-03 PROCEDURE — 99213 OFFICE O/P EST LOW 20 MIN: CPT

## 2023-11-17 ENCOUNTER — APPOINTMENT (OUTPATIENT)
Dept: HEMATOLOGY ONCOLOGY | Facility: CLINIC | Age: 78
End: 2023-11-17
Payer: COMMERCIAL

## 2023-11-17 VITALS
WEIGHT: 144.84 LBS | OXYGEN SATURATION: 97 % | BODY MASS INDEX: 30.27 KG/M2 | SYSTOLIC BLOOD PRESSURE: 132 MMHG | HEART RATE: 94 BPM | RESPIRATION RATE: 18 BRPM | DIASTOLIC BLOOD PRESSURE: 80 MMHG | TEMPERATURE: 97.2 F

## 2023-11-17 DIAGNOSIS — C50.919 MALIGNANT NEOPLASM OF UNSPECIFIED SITE OF UNSPECIFIED FEMALE BREAST: ICD-10-CM

## 2023-11-17 PROCEDURE — 99214 OFFICE O/P EST MOD 30 MIN: CPT

## 2023-11-17 RX ORDER — TRIAMCINOLONE ACETONIDE 1 MG/G
0.1 OINTMENT TOPICAL
Qty: 1 | Refills: 0 | Status: ACTIVE | COMMUNITY
Start: 2023-11-17 | End: 1900-01-01

## 2023-12-04 ENCOUNTER — RESULT REVIEW (OUTPATIENT)
Age: 78
End: 2023-12-04

## 2023-12-04 ENCOUNTER — APPOINTMENT (OUTPATIENT)
Dept: MAMMOGRAPHY | Facility: IMAGING CENTER | Age: 78
End: 2023-12-04
Payer: MEDICARE

## 2023-12-04 ENCOUNTER — APPOINTMENT (OUTPATIENT)
Dept: ULTRASOUND IMAGING | Facility: IMAGING CENTER | Age: 78
End: 2023-12-04
Payer: MEDICARE

## 2023-12-04 ENCOUNTER — OUTPATIENT (OUTPATIENT)
Dept: OUTPATIENT SERVICES | Facility: HOSPITAL | Age: 78
LOS: 1 days | End: 2023-12-04
Payer: MEDICARE

## 2023-12-04 DIAGNOSIS — C50.919 MALIGNANT NEOPLASM OF UNSPECIFIED SITE OF UNSPECIFIED FEMALE BREAST: ICD-10-CM

## 2023-12-04 DIAGNOSIS — Z90.11 ACQUIRED ABSENCE OF RIGHT BREAST AND NIPPLE: Chronic | ICD-10-CM

## 2023-12-04 PROCEDURE — 77065 DX MAMMO INCL CAD UNI: CPT | Mod: 26,LT

## 2023-12-04 PROCEDURE — 76641 ULTRASOUND BREAST COMPLETE: CPT

## 2023-12-04 PROCEDURE — G0279: CPT | Mod: 26

## 2023-12-04 PROCEDURE — 76641 ULTRASOUND BREAST COMPLETE: CPT | Mod: 26,LT

## 2023-12-04 PROCEDURE — G0279: CPT

## 2023-12-04 PROCEDURE — 77065 DX MAMMO INCL CAD UNI: CPT

## 2023-12-18 ENCOUNTER — APPOINTMENT (OUTPATIENT)
Dept: DERMATOLOGY | Facility: CLINIC | Age: 78
End: 2023-12-18

## 2024-01-16 ENCOUNTER — APPOINTMENT (OUTPATIENT)
Dept: DERMATOLOGY | Facility: CLINIC | Age: 79
End: 2024-01-16
Payer: COMMERCIAL

## 2024-01-16 PROCEDURE — 99204 OFFICE O/P NEW MOD 45 MIN: CPT

## 2024-01-16 RX ORDER — HALOBETASOL PROPIONATE 0.5 MG/G
0.05 OINTMENT TOPICAL
Qty: 3 | Refills: 3 | Status: ACTIVE | COMMUNITY
Start: 2024-01-16 | End: 1900-01-01

## 2024-02-13 ENCOUNTER — OUTPATIENT (OUTPATIENT)
Dept: OUTPATIENT SERVICES | Facility: HOSPITAL | Age: 79
LOS: 1 days | Discharge: HOME | End: 2024-02-13

## 2024-02-13 DIAGNOSIS — Z90.11 ACQUIRED ABSENCE OF RIGHT BREAST AND NIPPLE: Chronic | ICD-10-CM

## 2024-02-13 DIAGNOSIS — C50.919 MALIGNANT NEOPLASM OF UNSPECIFIED SITE OF UNSPECIFIED FEMALE BREAST: ICD-10-CM

## 2024-02-14 ENCOUNTER — APPOINTMENT (OUTPATIENT)
Dept: HEMATOLOGY ONCOLOGY | Facility: CLINIC | Age: 79
End: 2024-02-14

## 2024-03-04 ENCOUNTER — APPOINTMENT (OUTPATIENT)
Dept: DERMATOLOGY | Facility: CLINIC | Age: 79
End: 2024-03-04
Payer: COMMERCIAL

## 2024-03-04 DIAGNOSIS — L81.0 POSTINFLAMMATORY HYPERPIGMENTATION: ICD-10-CM

## 2024-03-04 DIAGNOSIS — L85.3 XEROSIS CUTIS: ICD-10-CM

## 2024-03-04 DIAGNOSIS — L30.9 DERMATITIS, UNSPECIFIED: ICD-10-CM

## 2024-03-04 PROCEDURE — 99214 OFFICE O/P EST MOD 30 MIN: CPT

## 2024-03-04 RX ORDER — TRIAMCINOLONE ACETONIDE 1 MG/G
0.1 OINTMENT TOPICAL
Qty: 1 | Refills: 3 | Status: ACTIVE | COMMUNITY
Start: 2024-03-04 | End: 1900-01-01

## 2024-03-06 ENCOUNTER — LABORATORY RESULT (OUTPATIENT)
Age: 79
End: 2024-03-06

## 2024-03-06 ENCOUNTER — APPOINTMENT (OUTPATIENT)
Dept: RHEUMATOLOGY | Facility: CLINIC | Age: 79
End: 2024-03-06
Payer: COMMERCIAL

## 2024-03-06 VITALS
HEART RATE: 102 BPM | RESPIRATION RATE: 16 BRPM | OXYGEN SATURATION: 98 % | HEIGHT: 58 IN | BODY MASS INDEX: 30.23 KG/M2 | TEMPERATURE: 98.2 F | SYSTOLIC BLOOD PRESSURE: 158 MMHG | WEIGHT: 144 LBS | DIASTOLIC BLOOD PRESSURE: 78 MMHG

## 2024-03-06 DIAGNOSIS — R05.9 COUGH, UNSPECIFIED: ICD-10-CM

## 2024-03-06 DIAGNOSIS — R74.8 ABNORMAL LEVELS OF OTHER SERUM ENZYMES: ICD-10-CM

## 2024-03-06 DIAGNOSIS — M06.00 RHEUMATOID ARTHRITIS W/OUT RHEUMATOID FACTOR, UNSPECIFIED SITE: ICD-10-CM

## 2024-03-06 DIAGNOSIS — M25.50 PAIN IN UNSPECIFIED JOINT: ICD-10-CM

## 2024-03-06 DIAGNOSIS — M17.9 OSTEOARTHRITIS OF KNEE, UNSPECIFIED: ICD-10-CM

## 2024-03-06 PROCEDURE — G2211 COMPLEX E/M VISIT ADD ON: CPT

## 2024-03-06 PROCEDURE — 99214 OFFICE O/P EST MOD 30 MIN: CPT

## 2024-03-06 NOTE — ASSESSMENT
[FreeTextEntry1] : 80 y/o F w OA, seronegative RA?  =b/l knee pain =worse in pm, w activities =no swelling, stiffness =labs 10/22 w high ESR, but negative RF, CCP  =xrays 10/22 w OA ************************************************************************** =new pain on L hip/lower back, only when getting up; no stiffness ************************************************************************** =persistent cough   I have doubt pt has seronegative RA, as clinically, pt presents like OA, although knee pain resolved and now has lower back pain and L hip pain only when getting up from sitting position. Given description of symptoms, doubt PMR, RA. Will obtain imaging, as likely pt has some degenerative/structural issue.   Pt does have unexplained ESR elevation, although one level drawn. Will repeat. Will expand serologies/work up, given pt reports that she has chronic cough...Pt should be referred to pulm and perhaps get PFTs, CT chest...  Plan  -Labs to measure disease activity and monitor for drug toxicities  -c/w ssz 500mg BID  [place], will report findings, and pt has to f/u at other location....

## 2024-03-06 NOTE — HISTORY OF PRESENT ILLNESS
[___ Month(s) Ago] : [unfilled] month(s) ago [FreeTextEntry1] : =pt sulfasalazine 500mg BID =pt has pain in L hip; lower back pain, only present when standing up  =has pain w coughing which is persistent  =pt UTD w colon ca screen and mammography =no fevers, SOB, CP, abdominal pain, n/v/d, rashes

## 2024-03-06 NOTE — PHYSICAL EXAM
[General Appearance - Well Developed] : well developed [General Appearance - Well Nourished] : well nourished [Sclera] : the sclera and conjunctiva were normal [Heart Sounds] : normal S1 and S2 [Auscultation Breath Sounds / Voice Sounds] : lungs were clear to auscultation bilaterally [Heart Sounds Gallop] : no gallops [Murmurs] : no murmurs [Heart Sounds Pericardial Friction Rub] : no pericardial rub [Musculoskeletal - Swelling] : no joint swelling seen [FreeTextEntry1] : no pain on hip ROM  [] : no rash [Skin Lesions] : no skin lesions [Oriented To Time, Place, And Person] : oriented to person, place, and time

## 2024-03-12 RX ORDER — SULFASALAZINE 500 MG/1
500 TABLET ORAL
Qty: 180 | Refills: 1 | Status: ACTIVE | COMMUNITY
Start: 2022-11-18 | End: 1900-01-01

## 2024-03-12 NOTE — ASSESSMENT
[FreeTextEntry1] : #eczematous dermatitis, resolving with #PIH #xerosis chronic; flaring KOH neg - stop clobetasol ointment - START Triamcinolone 0.1% oint, applied to affected area BID for 2-3 weeks, then off for 1-2 weeks. SED. - r/b/a topical steroids were discussed including but not limited to thinning of the skin, atrophy and dyspigmentation. - principles of dry skin care extensively reviewed including the importance of using an emollient at least once a day and avoiding fragranced products including soap and detergent - Discussed liberal use of non-comedogenic moisturizers. Examples include vaseline, cetaphil, and cerave moisturizing cream.

## 2024-03-12 NOTE — HISTORY OF PRESENT ILLNESS
[de-identified] : 78 yo F PMHx right breast cancer s/p mastectomy, CMF chemo, and radiation therapy here for itchy rash on breasts, trunk, arms and legs. Used clobetsaol BID for 2 weeks and then as needed. Moisturizing with vaseline ointment. Patient is traveling to [facility], unclear whether she will return.  [FreeTextEntry1] : Fu xerotic rash

## 2024-03-15 LAB
ALBUMIN MFR SERPL ELPH: 53.8 %
ALBUMIN SERPL ELPH-MCNC: 4.4 G/DL
ALBUMIN SERPL-MCNC: 3.8 G/DL
ALBUMIN/GLOB SERPL: 1.2 RATIO
ALP BLD-CCNC: 79 U/L
ALPHA1 GLOB MFR SERPL ELPH: 4 %
ALPHA1 GLOB SERPL ELPH-MCNC: 0.3 G/DL
ALPHA2 GLOB MFR SERPL ELPH: 12.4 %
ALPHA2 GLOB SERPL ELPH-MCNC: 0.9 G/DL
ALT SERPL-CCNC: 20 U/L
ANION GAP SERPL CALC-SCNC: 16 MMOL/L
APPEARANCE: CLEAR
AST SERPL-CCNC: 16 U/L
B-GLOBULIN MFR SERPL ELPH: 13.7 %
B-GLOBULIN SERPL ELPH-MCNC: 1 G/DL
BACTERIA: NEGATIVE /HPF
BASOPHILS # BLD AUTO: 0.03 K/UL
BASOPHILS NFR BLD AUTO: 0.4 %
BILIRUB SERPL-MCNC: 0.2 MG/DL
BILIRUBIN URINE: NEGATIVE
BLOOD URINE: NEGATIVE
BUN SERPL-MCNC: 18 MG/DL
C3 SERPL-MCNC: 170 MG/DL
C4 SERPL-MCNC: 26 MG/DL
CALCIUM SERPL-MCNC: 10.1 MG/DL
CAST: 0 /LPF
CHLORIDE SERPL-SCNC: 102 MMOL/L
CK SERPL-CCNC: 135 U/L
CO2 SERPL-SCNC: 21 MMOL/L
COLOR: YELLOW
CREAT SERPL-MCNC: 0.76 MG/DL
CREAT SPEC-SCNC: 82 MG/DL
CREAT/PROT UR: 0.3 RATIO
CRP SERPL-MCNC: 5 MG/L
DSDNA AB SER-ACNC: <12 IU/ML
EGFR: 80 ML/MIN/1.73M2
ENA RNP AB SER IA-ACNC: <0.2 AL
ENA SM AB SER IA-ACNC: <0.2 AL
ENA SS-A AB SER IA-ACNC: <0.2 AL
ENA SS-B AB SER IA-ACNC: <0.2 AL
EOSINOPHIL # BLD AUTO: 0.09 K/UL
EOSINOPHIL NFR BLD AUTO: 1.1 %
EPITHELIAL CELLS: 2 /HPF
ERYTHROCYTE [SEDIMENTATION RATE] IN BLOOD BY WESTERGREN METHOD: 50 MM/HR
GAMMA GLOB FLD ELPH-MCNC: 1.1 G/DL
GAMMA GLOB MFR SERPL ELPH: 16.1 %
GBM AB TITR SER IF: <0.2
GLUCOSE QUALITATIVE U: 100 MG/DL
GLUCOSE SERPL-MCNC: 108 MG/DL
HBV CORE IGG+IGM SER QL: NONREACTIVE
HBV SURFACE AB SER QL: NONREACTIVE
HBV SURFACE AG SER QL: NONREACTIVE
HCT VFR BLD CALC: 37.2 %
HCV AB SER QL: NONREACTIVE
HCV S/CO RATIO: 0.17 S/CO
HGB BLD-MCNC: 11.1 G/DL
IMM GRANULOCYTES NFR BLD AUTO: 0.3 %
INTERPRETATION SERPL IEP-IMP: NORMAL
KETONES URINE: NEGATIVE MG/DL
LDH SERPL-CCNC: 119 U/L
LEUKOCYTE ESTERASE URINE: ABNORMAL
LYMPHOCYTES # BLD AUTO: 2.31 K/UL
LYMPHOCYTES NFR BLD AUTO: 29.1 %
M PROTEIN SPEC IFE-MCNC: NORMAL
M TB IFN-G BLD-IMP: NEGATIVE
MAN DIFF?: NORMAL
MCHC RBC-ENTMCNC: 26.9 PG
MCHC RBC-ENTMCNC: 29.8 GM/DL
MCV RBC AUTO: 90.1 FL
MICROSCOPIC-UA: NORMAL
MONOCYTES # BLD AUTO: 0.61 K/UL
MONOCYTES NFR BLD AUTO: 7.7 %
MYELOPEROXIDASE AB SER QL IA: <5 UNITS
MYELOPEROXIDASE CELLS FLD QL: NEGATIVE
NEUTROPHILS # BLD AUTO: 4.89 K/UL
NEUTROPHILS NFR BLD AUTO: 61.4 %
NITRITE URINE: NEGATIVE
PH URINE: 5.5
PLATELET # BLD AUTO: 146 K/UL
POTASSIUM SERPL-SCNC: 4.2 MMOL/L
PROT SERPL-MCNC: 7.1 G/DL
PROT SERPL-MCNC: 7.1 G/DL
PROT SERPL-MCNC: 7.2 G/DL
PROT UR-MCNC: 22 MG/DL
PROTEIN URINE: NORMAL MG/DL
PROTEINASE3 AB SER IA-ACNC: <5 UNITS
PROTEINASE3 AB SER-ACNC: NEGATIVE
QUANTIFERON TB PLUS MITOGEN MINUS NIL: >10 IU/ML
QUANTIFERON TB PLUS NIL: 0.06 IU/ML
QUANTIFERON TB PLUS TB1 MINUS NIL: 0 IU/ML
QUANTIFERON TB PLUS TB2 MINUS NIL: 0 IU/ML
RBC # BLD: 4.13 M/UL
RBC # FLD: 17.1 %
RED BLOOD CELLS URINE: 0 /HPF
REVIEW: NORMAL
SODIUM SERPL-SCNC: 139 MMOL/L
SPECIFIC GRAVITY URINE: 1.02
UROBILINOGEN URINE: 0.2 MG/DL
WBC # FLD AUTO: 7.95 K/UL
WHITE BLOOD CELLS URINE: 2 /HPF

## 2024-03-19 LAB
CENP-A: <11 SI
CENP-B: <11 SI
FIBRILLARIN: <11 SI
PM/SCL-100: <11 SI
PM/SCL-75: <11 SI
RP11: <11 SI
RP155: <11 SI
SCL-70: <11 SI
TH/TO: <11 SI
U1-SNRNP RNP A: <11 SI
U1-SNRNP RNP C: <11 SI
U1-SNRNP RNP-70KD: <11 SI

## 2024-04-04 LAB
EJ AB SER QL: NEGATIVE
ENA JO1 AB SER IA-ACNC: <20 UNITS
ENA PM/SCL AB SER-ACNC: <20 UNITS
ENA SM+RNP AB SER IA-ACNC: <20 UNITS
ENA SS-A IGG SER QL: <20 UNITS
FIBRILLARIN AB SER QL: NEGATIVE
KU AB SER QL: NEGATIVE
MDA-5 (P140)(CADM-140): <20 UNITS
MI2 AB SER QL: NEGATIVE
NXP-2 (P140): <20 UNITS
OJ AB SER QL: NEGATIVE
PL12 AB SER QL: NEGATIVE
PL7 AB SER QL: NEGATIVE
SRP AB SERPL QL: NEGATIVE
TIF GAMMA (P155/140): <20 UNITS
U2 SNRNP AB SER QL: NEGATIVE

## 2025-02-06 ENCOUNTER — APPOINTMENT (OUTPATIENT)
Dept: ULTRASOUND IMAGING | Facility: CLINIC | Age: 80
End: 2025-02-06

## 2025-02-06 ENCOUNTER — APPOINTMENT (OUTPATIENT)
Dept: MAMMOGRAPHY | Facility: CLINIC | Age: 80
End: 2025-02-06
Payer: MEDICAID

## 2025-02-06 ENCOUNTER — RESULT REVIEW (OUTPATIENT)
Age: 80
End: 2025-02-06

## 2025-02-06 ENCOUNTER — OUTPATIENT (OUTPATIENT)
Dept: OUTPATIENT SERVICES | Facility: HOSPITAL | Age: 80
LOS: 1 days | End: 2025-02-06
Payer: MEDICAID

## 2025-02-06 DIAGNOSIS — Z90.11 ACQUIRED ABSENCE OF RIGHT BREAST AND NIPPLE: Chronic | ICD-10-CM

## 2025-02-06 PROCEDURE — G0279: CPT

## 2025-02-06 PROCEDURE — 76641 ULTRASOUND BREAST COMPLETE: CPT

## 2025-02-06 PROCEDURE — 76641 ULTRASOUND BREAST COMPLETE: CPT | Mod: 26,LT

## 2025-02-06 PROCEDURE — G0279: CPT | Mod: 26

## 2025-02-06 PROCEDURE — 77065 DX MAMMO INCL CAD UNI: CPT | Mod: 26,LT

## 2025-02-06 PROCEDURE — 77065 DX MAMMO INCL CAD UNI: CPT

## 2025-02-25 ENCOUNTER — OUTPATIENT (OUTPATIENT)
Dept: OUTPATIENT SERVICES | Facility: HOSPITAL | Age: 80
LOS: 1 days | Discharge: HOME | End: 2025-02-25

## 2025-02-25 DIAGNOSIS — Z90.11 ACQUIRED ABSENCE OF RIGHT BREAST AND NIPPLE: Chronic | ICD-10-CM

## 2025-02-26 ENCOUNTER — APPOINTMENT (OUTPATIENT)
Dept: HEMATOLOGY ONCOLOGY | Facility: CLINIC | Age: 80
End: 2025-02-26
Payer: MEDICAID

## 2025-02-26 ENCOUNTER — RESULT REVIEW (OUTPATIENT)
Age: 80
End: 2025-02-26

## 2025-02-26 VITALS
WEIGHT: 145.5 LBS | RESPIRATION RATE: 16 BRPM | SYSTOLIC BLOOD PRESSURE: 160 MMHG | HEART RATE: 114 BPM | BODY MASS INDEX: 30.41 KG/M2 | TEMPERATURE: 97.3 F | DIASTOLIC BLOOD PRESSURE: 94 MMHG | OXYGEN SATURATION: 97 %

## 2025-02-26 DIAGNOSIS — C50.919 MALIGNANT NEOPLASM OF UNSPECIFIED SITE OF UNSPECIFIED FEMALE BREAST: ICD-10-CM

## 2025-02-26 LAB
BASOPHILS # BLD AUTO: 0.04 K/UL — SIGNIFICANT CHANGE UP (ref 0–0.2)
BASOPHILS NFR BLD AUTO: 0.4 % — SIGNIFICANT CHANGE UP (ref 0–2)
EOSINOPHIL # BLD AUTO: 0.15 K/UL — SIGNIFICANT CHANGE UP (ref 0–0.5)
EOSINOPHIL NFR BLD AUTO: 1.6 % — SIGNIFICANT CHANGE UP (ref 0–6)
HCT VFR BLD CALC: 35.8 % — SIGNIFICANT CHANGE UP (ref 34.5–45)
HGB BLD-MCNC: 11.3 G/DL — LOW (ref 11.5–15.5)
IMM GRANULOCYTES NFR BLD AUTO: 0.4 % — SIGNIFICANT CHANGE UP (ref 0–0.9)
LYMPHOCYTES # BLD AUTO: 3.04 K/UL — SIGNIFICANT CHANGE UP (ref 1–3.3)
LYMPHOCYTES # BLD AUTO: 32 % — SIGNIFICANT CHANGE UP (ref 13–44)
MCHC RBC-ENTMCNC: 27.2 PG — SIGNIFICANT CHANGE UP (ref 27–34)
MCHC RBC-ENTMCNC: 31.6 G/DL — LOW (ref 32–36)
MCV RBC AUTO: 86.3 FL — SIGNIFICANT CHANGE UP (ref 80–100)
MONOCYTES # BLD AUTO: 0.67 K/UL — SIGNIFICANT CHANGE UP (ref 0–0.9)
MONOCYTES NFR BLD AUTO: 7.1 % — SIGNIFICANT CHANGE UP (ref 2–14)
NEUTROPHILS # BLD AUTO: 5.55 K/UL — SIGNIFICANT CHANGE UP (ref 1.8–7.4)
NEUTROPHILS NFR BLD AUTO: 58.5 % — SIGNIFICANT CHANGE UP (ref 43–77)
NRBC BLD AUTO-RTO: 0 /100 WBCS — SIGNIFICANT CHANGE UP (ref 0–0)
PLATELET # BLD AUTO: 157 K/UL — SIGNIFICANT CHANGE UP (ref 150–400)
RBC # BLD: 4.15 M/UL — SIGNIFICANT CHANGE UP (ref 3.8–5.2)
RBC # FLD: 16.7 % — HIGH (ref 10.3–14.5)
WBC # BLD: 9.49 K/UL — SIGNIFICANT CHANGE UP (ref 3.8–10.5)
WBC # FLD AUTO: 9.49 K/UL — SIGNIFICANT CHANGE UP (ref 3.8–10.5)

## 2025-02-26 PROCEDURE — G2211 COMPLEX E/M VISIT ADD ON: CPT | Mod: NC

## 2025-02-26 PROCEDURE — 99214 OFFICE O/P EST MOD 30 MIN: CPT

## 2025-02-27 LAB
ALBUMIN SERPL ELPH-MCNC: 4.5 G/DL
ALP BLD-CCNC: 80 U/L
ALT SERPL-CCNC: 28 U/L
ANION GAP SERPL CALC-SCNC: 14 MMOL/L
AST SERPL-CCNC: 30 U/L
BILIRUB SERPL-MCNC: 0.3 MG/DL
BUN SERPL-MCNC: 17 MG/DL
CALCIUM SERPL-MCNC: 10.4 MG/DL
CANCER AG27-29 SERPL-ACNC: 10.6 U/ML
CEA SERPL-MCNC: 3.2 NG/ML
CHLORIDE SERPL-SCNC: 98 MMOL/L
CO2 SERPL-SCNC: 24 MMOL/L
CREAT SERPL-MCNC: 0.76 MG/DL
EGFR: 79 ML/MIN/1.73M2
GLUCOSE SERPL-MCNC: 140 MG/DL
POTASSIUM SERPL-SCNC: 4.7 MMOL/L
PROT SERPL-MCNC: 7.4 G/DL
SODIUM SERPL-SCNC: 136 MMOL/L

## 2025-02-28 ENCOUNTER — APPOINTMENT (OUTPATIENT)
Dept: RHEUMATOLOGY | Facility: CLINIC | Age: 80
End: 2025-02-28
Payer: MEDICAID

## 2025-02-28 VITALS
RESPIRATION RATE: 19 BRPM | OXYGEN SATURATION: 97 % | SYSTOLIC BLOOD PRESSURE: 136 MMHG | DIASTOLIC BLOOD PRESSURE: 80 MMHG | WEIGHT: 147 LBS | BODY MASS INDEX: 30.86 KG/M2 | HEIGHT: 58 IN | HEART RATE: 108 BPM

## 2025-02-28 DIAGNOSIS — M51.9 UNSPECIFIED THORACIC, THORACOLUMBAR AND LUMBOSACRAL INTERVERTEBRAL DISC DISORDER: ICD-10-CM

## 2025-02-28 DIAGNOSIS — M06.00 RHEUMATOID ARTHRITIS W/OUT RHEUMATOID FACTOR, UNSPECIFIED SITE: ICD-10-CM

## 2025-02-28 PROCEDURE — G2211 COMPLEX E/M VISIT ADD ON: CPT | Mod: NC

## 2025-02-28 PROCEDURE — 99214 OFFICE O/P EST MOD 30 MIN: CPT

## 2025-03-19 ENCOUNTER — APPOINTMENT (OUTPATIENT)
Dept: NUCLEAR MEDICINE | Facility: IMAGING CENTER | Age: 80
End: 2025-03-19
Payer: MEDICAID

## 2025-03-19 ENCOUNTER — OUTPATIENT (OUTPATIENT)
Dept: OUTPATIENT SERVICES | Facility: HOSPITAL | Age: 80
LOS: 1 days | End: 2025-03-19
Payer: MEDICAID

## 2025-03-19 DIAGNOSIS — C50.919 MALIGNANT NEOPLASM OF UNSPECIFIED SITE OF UNSPECIFIED FEMALE BREAST: ICD-10-CM

## 2025-03-19 DIAGNOSIS — Z90.11 ACQUIRED ABSENCE OF RIGHT BREAST AND NIPPLE: Chronic | ICD-10-CM

## 2025-03-19 PROCEDURE — A9561: CPT

## 2025-03-19 PROCEDURE — 78306 BONE IMAGING WHOLE BODY: CPT

## 2025-03-19 PROCEDURE — 78306 BONE IMAGING WHOLE BODY: CPT | Mod: 26

## 2025-03-25 ENCOUNTER — NON-APPOINTMENT (OUTPATIENT)
Age: 80
End: 2025-03-25

## 2025-04-02 ENCOUNTER — EMERGENCY (EMERGENCY)
Facility: HOSPITAL | Age: 80
LOS: 1 days | Discharge: HOME | End: 2025-04-02
Attending: STUDENT IN AN ORGANIZED HEALTH CARE EDUCATION/TRAINING PROGRAM | Admitting: EMERGENCY MEDICINE
Payer: MEDICAID

## 2025-04-02 VITALS
TEMPERATURE: 98 F | DIASTOLIC BLOOD PRESSURE: 90 MMHG | HEART RATE: 94 BPM | WEIGHT: 149.91 LBS | OXYGEN SATURATION: 99 % | SYSTOLIC BLOOD PRESSURE: 175 MMHG | RESPIRATION RATE: 18 BRPM

## 2025-04-02 DIAGNOSIS — Z90.11 ACQUIRED ABSENCE OF RIGHT BREAST AND NIPPLE: Chronic | ICD-10-CM

## 2025-04-02 PROCEDURE — 99284 EMERGENCY DEPT VISIT MOD MDM: CPT | Mod: 25

## 2025-04-03 ENCOUNTER — RESULT REVIEW (OUTPATIENT)
Age: 80
End: 2025-04-03

## 2025-04-03 LAB
ADD ON TEST-SPECIMEN IN LAB: SIGNIFICANT CHANGE UP
ADD ON TEST-SPECIMEN IN LAB: SIGNIFICANT CHANGE UP
ALBUMIN SERPL ELPH-MCNC: 3.9 G/DL — SIGNIFICANT CHANGE UP (ref 3.3–5)
ALP SERPL-CCNC: 79 U/L — SIGNIFICANT CHANGE UP (ref 40–120)
ALT FLD-CCNC: 21 U/L — SIGNIFICANT CHANGE UP (ref 4–33)
ANION GAP SERPL CALC-SCNC: 18 MMOL/L — HIGH (ref 7–14)
ANION GAP SERPL CALC-SCNC: 19 MMOL/L — HIGH (ref 7–14)
ANION GAP SERPL CALC-SCNC: 21 MMOL/L — HIGH (ref 7–14)
APPEARANCE UR: CLEAR — SIGNIFICANT CHANGE UP
AST SERPL-CCNC: 27 U/L — SIGNIFICANT CHANGE UP (ref 4–32)
BASOPHILS # BLD AUTO: 0.03 K/UL — SIGNIFICANT CHANGE UP (ref 0–0.2)
BASOPHILS NFR BLD AUTO: 0.3 % — SIGNIFICANT CHANGE UP (ref 0–2)
BILIRUB SERPL-MCNC: 0.3 MG/DL — SIGNIFICANT CHANGE UP (ref 0.2–1.2)
BILIRUB UR-MCNC: NEGATIVE — SIGNIFICANT CHANGE UP
BLOOD GAS VENOUS COMPREHENSIVE RESULT: SIGNIFICANT CHANGE UP
BUN SERPL-MCNC: 17 MG/DL — SIGNIFICANT CHANGE UP (ref 7–23)
BUN SERPL-MCNC: 18 MG/DL — SIGNIFICANT CHANGE UP (ref 7–23)
BUN SERPL-MCNC: 19 MG/DL — SIGNIFICANT CHANGE UP (ref 7–23)
CALCIUM SERPL-MCNC: 9 MG/DL — SIGNIFICANT CHANGE UP (ref 8.4–10.5)
CALCIUM SERPL-MCNC: 9.4 MG/DL — SIGNIFICANT CHANGE UP (ref 8.4–10.5)
CALCIUM SERPL-MCNC: 9.9 MG/DL — SIGNIFICANT CHANGE UP (ref 8.4–10.5)
CHLORIDE SERPL-SCNC: 100 MMOL/L — SIGNIFICANT CHANGE UP (ref 98–107)
CHLORIDE SERPL-SCNC: 92 MMOL/L — LOW (ref 98–107)
CHLORIDE SERPL-SCNC: 95 MMOL/L — LOW (ref 98–107)
CO2 SERPL-SCNC: 17 MMOL/L — LOW (ref 22–31)
CO2 SERPL-SCNC: 19 MMOL/L — LOW (ref 22–31)
CO2 SERPL-SCNC: 20 MMOL/L — LOW (ref 22–31)
COLOR SPEC: YELLOW — SIGNIFICANT CHANGE UP
CREAT SERPL-MCNC: 0.64 MG/DL — SIGNIFICANT CHANGE UP (ref 0.5–1.3)
CREAT SERPL-MCNC: 0.7 MG/DL — SIGNIFICANT CHANGE UP (ref 0.5–1.3)
CREAT SERPL-MCNC: 0.74 MG/DL — SIGNIFICANT CHANGE UP (ref 0.5–1.3)
DIFF PNL FLD: NEGATIVE — SIGNIFICANT CHANGE UP
EGFR: 82 ML/MIN/1.73M2 — SIGNIFICANT CHANGE UP
EGFR: 82 ML/MIN/1.73M2 — SIGNIFICANT CHANGE UP
EGFR: 87 ML/MIN/1.73M2 — SIGNIFICANT CHANGE UP
EGFR: 87 ML/MIN/1.73M2 — SIGNIFICANT CHANGE UP
EGFR: 89 ML/MIN/1.73M2 — SIGNIFICANT CHANGE UP
EGFR: 89 ML/MIN/1.73M2 — SIGNIFICANT CHANGE UP
EOSINOPHIL # BLD AUTO: 0.09 K/UL — SIGNIFICANT CHANGE UP (ref 0–0.5)
EOSINOPHIL NFR BLD AUTO: 0.9 % — SIGNIFICANT CHANGE UP (ref 0–6)
GLUCOSE SERPL-MCNC: 182 MG/DL — HIGH (ref 70–99)
GLUCOSE SERPL-MCNC: 189 MG/DL — HIGH (ref 70–99)
GLUCOSE SERPL-MCNC: 219 MG/DL — HIGH (ref 70–99)
GLUCOSE UR QL: 100 MG/DL
HCT VFR BLD CALC: 34.3 % — LOW (ref 34.5–45)
HGB BLD-MCNC: 10.9 G/DL — LOW (ref 11.5–15.5)
IANC: 6.76 K/UL — SIGNIFICANT CHANGE UP (ref 1.8–7.4)
IMM GRANULOCYTES NFR BLD AUTO: 0.6 % — SIGNIFICANT CHANGE UP (ref 0–0.9)
KETONES UR-MCNC: NEGATIVE MG/DL — SIGNIFICANT CHANGE UP
LEUKOCYTE ESTERASE UR-ACNC: NEGATIVE — SIGNIFICANT CHANGE UP
LIDOCAIN IGE QN: 52 U/L — SIGNIFICANT CHANGE UP (ref 7–60)
LYMPHOCYTES # BLD AUTO: 2.95 K/UL — SIGNIFICANT CHANGE UP (ref 1–3.3)
LYMPHOCYTES # BLD AUTO: 28 % — SIGNIFICANT CHANGE UP (ref 13–44)
MCHC RBC-ENTMCNC: 26.6 PG — LOW (ref 27–34)
MCHC RBC-ENTMCNC: 31.8 G/DL — LOW (ref 32–36)
MCV RBC AUTO: 83.7 FL — SIGNIFICANT CHANGE UP (ref 80–100)
MONOCYTES # BLD AUTO: 0.65 K/UL — SIGNIFICANT CHANGE UP (ref 0–0.9)
MONOCYTES NFR BLD AUTO: 6.2 % — SIGNIFICANT CHANGE UP (ref 2–14)
NEUTROPHILS # BLD AUTO: 6.76 K/UL — SIGNIFICANT CHANGE UP (ref 1.8–7.4)
NEUTROPHILS NFR BLD AUTO: 64 % — SIGNIFICANT CHANGE UP (ref 43–77)
NITRITE UR-MCNC: NEGATIVE — SIGNIFICANT CHANGE UP
NRBC # BLD AUTO: 0 K/UL — SIGNIFICANT CHANGE UP (ref 0–0)
NRBC # FLD: 0 K/UL — SIGNIFICANT CHANGE UP (ref 0–0)
NRBC BLD AUTO-RTO: 0 /100 WBCS — SIGNIFICANT CHANGE UP (ref 0–0)
PH UR: 6 — SIGNIFICANT CHANGE UP (ref 5–8)
PLATELET # BLD AUTO: 189 K/UL — SIGNIFICANT CHANGE UP (ref 150–400)
POTASSIUM SERPL-MCNC: 4.1 MMOL/L — SIGNIFICANT CHANGE UP (ref 3.5–5.3)
POTASSIUM SERPL-MCNC: 4.3 MMOL/L — SIGNIFICANT CHANGE UP (ref 3.5–5.3)
POTASSIUM SERPL-MCNC: 4.4 MMOL/L — SIGNIFICANT CHANGE UP (ref 3.5–5.3)
POTASSIUM SERPL-SCNC: 4.1 MMOL/L — SIGNIFICANT CHANGE UP (ref 3.5–5.3)
POTASSIUM SERPL-SCNC: 4.3 MMOL/L — SIGNIFICANT CHANGE UP (ref 3.5–5.3)
POTASSIUM SERPL-SCNC: 4.4 MMOL/L — SIGNIFICANT CHANGE UP (ref 3.5–5.3)
PROT SERPL-MCNC: 7.3 G/DL — SIGNIFICANT CHANGE UP (ref 6–8.3)
PROT UR-MCNC: NEGATIVE MG/DL — SIGNIFICANT CHANGE UP
RBC # BLD: 4.1 M/UL — SIGNIFICANT CHANGE UP (ref 3.8–5.2)
RBC # FLD: 16.5 % — HIGH (ref 10.3–14.5)
SODIUM SERPL-SCNC: 133 MMOL/L — LOW (ref 135–145)
SODIUM SERPL-SCNC: 133 MMOL/L — LOW (ref 135–145)
SODIUM SERPL-SCNC: 135 MMOL/L — SIGNIFICANT CHANGE UP (ref 135–145)
SP GR SPEC: 1.02 — SIGNIFICANT CHANGE UP (ref 1–1.03)
TROPONIN T, HIGH SENSITIVITY RESULT: 16 NG/L — SIGNIFICANT CHANGE UP
UROBILINOGEN FLD QL: 0.2 MG/DL — SIGNIFICANT CHANGE UP (ref 0.2–1)
WBC # BLD: 10.54 K/UL — HIGH (ref 3.8–10.5)
WBC # FLD AUTO: 10.54 K/UL — HIGH (ref 3.8–10.5)

## 2025-04-03 PROCEDURE — 93010 ELECTROCARDIOGRAM REPORT: CPT

## 2025-04-03 PROCEDURE — 93356 MYOCRD STRAIN IMG SPCKL TRCK: CPT

## 2025-04-03 PROCEDURE — 71046 X-RAY EXAM CHEST 2 VIEWS: CPT | Mod: 26

## 2025-04-03 PROCEDURE — 93306 TTE W/DOPPLER COMPLETE: CPT | Mod: 26

## 2025-04-03 PROCEDURE — 99223 1ST HOSP IP/OBS HIGH 75: CPT

## 2025-04-03 PROCEDURE — 74177 CT ABD & PELVIS W/CONTRAST: CPT | Mod: 26

## 2025-04-03 RX ORDER — ASPIRIN 325 MG
81 TABLET ORAL DAILY
Refills: 0 | Status: DISCONTINUED | OUTPATIENT
Start: 2025-04-03 | End: 2025-04-06

## 2025-04-03 RX ORDER — DEXTROSE 50 % IN WATER 50 %
12.5 SYRINGE (ML) INTRAVENOUS ONCE
Refills: 0 | Status: DISCONTINUED | OUTPATIENT
Start: 2025-04-03 | End: 2025-04-06

## 2025-04-03 RX ORDER — LOSARTAN POTASSIUM 100 MG/1
50 TABLET, FILM COATED ORAL DAILY
Refills: 0 | Status: DISCONTINUED | OUTPATIENT
Start: 2025-04-03 | End: 2025-04-06

## 2025-04-03 RX ORDER — DEXTROSE 50 % IN WATER 50 %
25 SYRINGE (ML) INTRAVENOUS ONCE
Refills: 0 | Status: DISCONTINUED | OUTPATIENT
Start: 2025-04-03 | End: 2025-04-06

## 2025-04-03 RX ORDER — ATORVASTATIN CALCIUM 80 MG/1
20 TABLET, FILM COATED ORAL AT BEDTIME
Refills: 0 | Status: DISCONTINUED | OUTPATIENT
Start: 2025-04-03 | End: 2025-04-06

## 2025-04-03 RX ORDER — ACETAMINOPHEN 500 MG/5ML
1000 LIQUID (ML) ORAL ONCE
Refills: 0 | Status: COMPLETED | OUTPATIENT
Start: 2025-04-03 | End: 2025-04-03

## 2025-04-03 RX ORDER — INSULIN LISPRO 100 U/ML
INJECTION, SOLUTION INTRAVENOUS; SUBCUTANEOUS
Refills: 0 | Status: DISCONTINUED | OUTPATIENT
Start: 2025-04-03 | End: 2025-04-06

## 2025-04-03 RX ORDER — SODIUM CHLORIDE 9 G/1000ML
1000 INJECTION, SOLUTION INTRAVENOUS ONCE
Refills: 0 | Status: COMPLETED | OUTPATIENT
Start: 2025-04-03 | End: 2025-04-03

## 2025-04-03 RX ORDER — AMLODIPINE BESYLATE 10 MG/1
10 TABLET ORAL DAILY
Refills: 0 | Status: DISCONTINUED | OUTPATIENT
Start: 2025-04-03 | End: 2025-04-06

## 2025-04-03 RX ORDER — INSULIN LISPRO 100 U/ML
INJECTION, SOLUTION INTRAVENOUS; SUBCUTANEOUS AT BEDTIME
Refills: 0 | Status: DISCONTINUED | OUTPATIENT
Start: 2025-04-03 | End: 2025-04-06

## 2025-04-03 RX ORDER — SODIUM CHLORIDE 9 G/1000ML
1000 INJECTION, SOLUTION INTRAVENOUS
Refills: 0 | Status: DISCONTINUED | OUTPATIENT
Start: 2025-04-03 | End: 2025-04-06

## 2025-04-03 RX ORDER — GLUCAGON 3 MG/1
1 POWDER NASAL ONCE
Refills: 0 | Status: DISCONTINUED | OUTPATIENT
Start: 2025-04-03 | End: 2025-04-06

## 2025-04-03 RX ORDER — DEXTROSE 50 % IN WATER 50 %
15 SYRINGE (ML) INTRAVENOUS ONCE
Refills: 0 | Status: DISCONTINUED | OUTPATIENT
Start: 2025-04-03 | End: 2025-04-06

## 2025-04-03 RX ADMIN — Medication 20 MILLIGRAM(S): at 02:41

## 2025-04-03 RX ADMIN — INSULIN LISPRO 2: 100 INJECTION, SOLUTION INTRAVENOUS; SUBCUTANEOUS at 17:03

## 2025-04-03 RX ADMIN — Medication 81 MILLIGRAM(S): at 11:06

## 2025-04-03 RX ADMIN — Medication 400 MILLIGRAM(S): at 02:42

## 2025-04-03 RX ADMIN — ATORVASTATIN CALCIUM 20 MILLIGRAM(S): 80 TABLET, FILM COATED ORAL at 22:27

## 2025-04-03 RX ADMIN — INSULIN LISPRO 2: 100 INJECTION, SOLUTION INTRAVENOUS; SUBCUTANEOUS at 11:07

## 2025-04-03 RX ADMIN — Medication 1000 MILLILITER(S): at 02:41

## 2025-04-03 RX ADMIN — LOSARTAN POTASSIUM 50 MILLIGRAM(S): 100 TABLET, FILM COATED ORAL at 11:06

## 2025-04-03 RX ADMIN — SODIUM CHLORIDE 1000 MILLILITER(S): 9 INJECTION, SOLUTION INTRAVENOUS at 05:05

## 2025-04-03 RX ADMIN — AMLODIPINE BESYLATE 10 MILLIGRAM(S): 10 TABLET ORAL at 11:06

## 2025-04-04 VITALS
SYSTOLIC BLOOD PRESSURE: 127 MMHG | OXYGEN SATURATION: 99 % | DIASTOLIC BLOOD PRESSURE: 74 MMHG | TEMPERATURE: 98 F | HEART RATE: 97 BPM | RESPIRATION RATE: 16 BRPM

## 2025-04-04 LAB
ALBUMIN SERPL ELPH-MCNC: 3.8 G/DL — SIGNIFICANT CHANGE UP (ref 3.3–5)
ALP SERPL-CCNC: 74 U/L — SIGNIFICANT CHANGE UP (ref 40–120)
ALT FLD-CCNC: 18 U/L — SIGNIFICANT CHANGE UP (ref 4–33)
ANION GAP SERPL CALC-SCNC: 16 MMOL/L — HIGH (ref 7–14)
AST SERPL-CCNC: 20 U/L — SIGNIFICANT CHANGE UP (ref 4–32)
BASOPHILS # BLD AUTO: 0.03 K/UL — SIGNIFICANT CHANGE UP (ref 0–0.2)
BASOPHILS NFR BLD AUTO: 0.4 % — SIGNIFICANT CHANGE UP (ref 0–2)
BILIRUB SERPL-MCNC: 0.3 MG/DL — SIGNIFICANT CHANGE UP (ref 0.2–1.2)
BLOOD GAS VENOUS COMPREHENSIVE RESULT: SIGNIFICANT CHANGE UP
BUN SERPL-MCNC: 16 MG/DL — SIGNIFICANT CHANGE UP (ref 7–23)
CALCIUM SERPL-MCNC: 9.5 MG/DL — SIGNIFICANT CHANGE UP (ref 8.4–10.5)
CHLORIDE SERPL-SCNC: 100 MMOL/L — SIGNIFICANT CHANGE UP (ref 98–107)
CHOLEST SERPL-MCNC: 142 MG/DL — SIGNIFICANT CHANGE UP
CO2 SERPL-SCNC: 21 MMOL/L — LOW (ref 22–31)
CREAT SERPL-MCNC: 0.69 MG/DL — SIGNIFICANT CHANGE UP (ref 0.5–1.3)
EGFR: 88 ML/MIN/1.73M2 — SIGNIFICANT CHANGE UP
EGFR: 88 ML/MIN/1.73M2 — SIGNIFICANT CHANGE UP
EOSINOPHIL # BLD AUTO: 0.19 K/UL — SIGNIFICANT CHANGE UP (ref 0–0.5)
EOSINOPHIL NFR BLD AUTO: 2.4 % — SIGNIFICANT CHANGE UP (ref 0–6)
GLUCOSE SERPL-MCNC: 197 MG/DL — HIGH (ref 70–99)
HCT VFR BLD CALC: 34 % — LOW (ref 34.5–45)
HDLC SERPL-MCNC: 57 MG/DL — SIGNIFICANT CHANGE UP
HGB BLD-MCNC: 10.6 G/DL — LOW (ref 11.5–15.5)
IANC: 4.53 K/UL — SIGNIFICANT CHANGE UP (ref 1.8–7.4)
IMM GRANULOCYTES NFR BLD AUTO: 0.6 % — SIGNIFICANT CHANGE UP (ref 0–0.9)
LDLC SERPL-MCNC: 65 MG/DL — SIGNIFICANT CHANGE UP
LIPID PNL WITH DIRECT LDL SERPL: 65 MG/DL — SIGNIFICANT CHANGE UP
LYMPHOCYTES # BLD AUTO: 2.38 K/UL — SIGNIFICANT CHANGE UP (ref 1–3.3)
LYMPHOCYTES # BLD AUTO: 30.6 % — SIGNIFICANT CHANGE UP (ref 13–44)
MAGNESIUM SERPL-MCNC: 1.8 MG/DL — SIGNIFICANT CHANGE UP (ref 1.6–2.6)
MCHC RBC-ENTMCNC: 26.8 PG — LOW (ref 27–34)
MCHC RBC-ENTMCNC: 31.2 G/DL — LOW (ref 32–36)
MCV RBC AUTO: 85.9 FL — SIGNIFICANT CHANGE UP (ref 80–100)
MONOCYTES # BLD AUTO: 0.59 K/UL — SIGNIFICANT CHANGE UP (ref 0–0.9)
MONOCYTES NFR BLD AUTO: 7.6 % — SIGNIFICANT CHANGE UP (ref 2–14)
NEUTROPHILS # BLD AUTO: 4.53 K/UL — SIGNIFICANT CHANGE UP (ref 1.8–7.4)
NEUTROPHILS NFR BLD AUTO: 58.4 % — SIGNIFICANT CHANGE UP (ref 43–77)
NONHDLC SERPL-MCNC: 85 MG/DL — SIGNIFICANT CHANGE UP
NRBC # BLD AUTO: 0 K/UL — SIGNIFICANT CHANGE UP (ref 0–0)
NRBC # FLD: 0 K/UL — SIGNIFICANT CHANGE UP (ref 0–0)
NRBC BLD AUTO-RTO: 0 /100 WBCS — SIGNIFICANT CHANGE UP (ref 0–0)
PHOSPHATE SERPL-MCNC: 3.3 MG/DL — SIGNIFICANT CHANGE UP (ref 2.5–4.5)
PLATELET # BLD AUTO: 171 K/UL — SIGNIFICANT CHANGE UP (ref 150–400)
POTASSIUM SERPL-MCNC: 3.7 MMOL/L — SIGNIFICANT CHANGE UP (ref 3.5–5.3)
POTASSIUM SERPL-SCNC: 3.7 MMOL/L — SIGNIFICANT CHANGE UP (ref 3.5–5.3)
PROT SERPL-MCNC: 7.2 G/DL — SIGNIFICANT CHANGE UP (ref 6–8.3)
RBC # BLD: 3.96 M/UL — SIGNIFICANT CHANGE UP (ref 3.8–5.2)
RBC # FLD: 16.8 % — HIGH (ref 10.3–14.5)
SODIUM SERPL-SCNC: 137 MMOL/L — SIGNIFICANT CHANGE UP (ref 135–145)
TRIGL SERPL-MCNC: 108 MG/DL — SIGNIFICANT CHANGE UP
WBC # BLD: 7.77 K/UL — SIGNIFICANT CHANGE UP (ref 3.8–10.5)
WBC # FLD AUTO: 7.77 K/UL — SIGNIFICANT CHANGE UP (ref 3.8–10.5)

## 2025-04-04 PROCEDURE — 93010 ELECTROCARDIOGRAM REPORT: CPT

## 2025-04-04 PROCEDURE — 99239 HOSP IP/OBS DSCHRG MGMT >30: CPT

## 2025-04-04 RX ORDER — INSULIN GLARGINE-YFGN 100 [IU]/ML
28 INJECTION, SOLUTION SUBCUTANEOUS ONCE
Refills: 0 | Status: COMPLETED | OUTPATIENT
Start: 2025-04-04 | End: 2025-04-04

## 2025-04-04 RX ADMIN — LOSARTAN POTASSIUM 50 MILLIGRAM(S): 100 TABLET, FILM COATED ORAL at 06:31

## 2025-04-04 RX ADMIN — INSULIN LISPRO 5: 100 INJECTION, SOLUTION INTRAVENOUS; SUBCUTANEOUS at 16:29

## 2025-04-04 RX ADMIN — AMLODIPINE BESYLATE 10 MILLIGRAM(S): 10 TABLET ORAL at 06:31

## 2025-04-04 RX ADMIN — INSULIN LISPRO 1: 100 INJECTION, SOLUTION INTRAVENOUS; SUBCUTANEOUS at 07:25

## 2025-04-04 RX ADMIN — INSULIN LISPRO 2: 100 INJECTION, SOLUTION INTRAVENOUS; SUBCUTANEOUS at 12:05

## 2025-04-04 RX ADMIN — INSULIN GLARGINE-YFGN 28 UNIT(S): 100 INJECTION, SOLUTION SUBCUTANEOUS at 02:37

## 2025-04-04 RX ADMIN — Medication 81 MILLIGRAM(S): at 12:06

## 2025-04-21 ENCOUNTER — OUTPATIENT (OUTPATIENT)
Dept: OUTPATIENT SERVICES | Facility: HOSPITAL | Age: 80
LOS: 1 days | End: 2025-04-21
Payer: MEDICAID

## 2025-04-21 ENCOUNTER — APPOINTMENT (OUTPATIENT)
Dept: CT IMAGING | Facility: IMAGING CENTER | Age: 80
End: 2025-04-21
Payer: MEDICAID

## 2025-04-21 DIAGNOSIS — Z90.11 ACQUIRED ABSENCE OF RIGHT BREAST AND NIPPLE: Chronic | ICD-10-CM

## 2025-04-21 PROCEDURE — 74177 CT ABD & PELVIS W/CONTRAST: CPT | Mod: 26

## 2025-04-21 PROCEDURE — 71260 CT THORAX DX C+: CPT

## 2025-04-21 PROCEDURE — 71260 CT THORAX DX C+: CPT | Mod: 26

## 2025-04-21 PROCEDURE — 74177 CT ABD & PELVIS W/CONTRAST: CPT

## 2025-04-22 ENCOUNTER — NON-APPOINTMENT (OUTPATIENT)
Age: 80
End: 2025-04-22

## 2025-07-10 ENCOUNTER — EMERGENCY (EMERGENCY)
Facility: HOSPITAL | Age: 80
LOS: 1 days | End: 2025-07-10
Attending: STUDENT IN AN ORGANIZED HEALTH CARE EDUCATION/TRAINING PROGRAM | Admitting: STUDENT IN AN ORGANIZED HEALTH CARE EDUCATION/TRAINING PROGRAM
Payer: MEDICAID

## 2025-07-10 VITALS
WEIGHT: 149.91 LBS | HEIGHT: 60 IN | HEART RATE: 93 BPM | OXYGEN SATURATION: 99 % | TEMPERATURE: 98 F | DIASTOLIC BLOOD PRESSURE: 73 MMHG | SYSTOLIC BLOOD PRESSURE: 132 MMHG | RESPIRATION RATE: 18 BRPM

## 2025-07-10 VITALS
OXYGEN SATURATION: 97 % | HEART RATE: 88 BPM | SYSTOLIC BLOOD PRESSURE: 128 MMHG | TEMPERATURE: 98 F | DIASTOLIC BLOOD PRESSURE: 66 MMHG | RESPIRATION RATE: 17 BRPM

## 2025-07-10 DIAGNOSIS — Z90.11 ACQUIRED ABSENCE OF RIGHT BREAST AND NIPPLE: Chronic | ICD-10-CM

## 2025-07-10 LAB
ALBUMIN SERPL ELPH-MCNC: 4 G/DL — SIGNIFICANT CHANGE UP (ref 3.3–5)
ALP SERPL-CCNC: 84 U/L — SIGNIFICANT CHANGE UP (ref 40–120)
ALT FLD-CCNC: 16 U/L — SIGNIFICANT CHANGE UP (ref 4–33)
ANION GAP SERPL CALC-SCNC: 15 MMOL/L — HIGH (ref 7–14)
AST SERPL-CCNC: 16 U/L — SIGNIFICANT CHANGE UP (ref 4–32)
BASOPHILS # BLD AUTO: 0.04 K/UL — SIGNIFICANT CHANGE UP (ref 0–0.2)
BASOPHILS NFR BLD AUTO: 0.5 % — SIGNIFICANT CHANGE UP (ref 0–2)
BILIRUB SERPL-MCNC: <0.2 MG/DL — SIGNIFICANT CHANGE UP (ref 0.2–1.2)
BUN SERPL-MCNC: 18 MG/DL — SIGNIFICANT CHANGE UP (ref 7–23)
CALCIUM SERPL-MCNC: 9.9 MG/DL — SIGNIFICANT CHANGE UP (ref 8.4–10.5)
CHLORIDE SERPL-SCNC: 100 MMOL/L — SIGNIFICANT CHANGE UP (ref 98–107)
CO2 SERPL-SCNC: 21 MMOL/L — LOW (ref 22–31)
CREAT SERPL-MCNC: 0.87 MG/DL — SIGNIFICANT CHANGE UP (ref 0.5–1.3)
EGFR: 67 ML/MIN/1.73M2 — SIGNIFICANT CHANGE UP
EGFR: 67 ML/MIN/1.73M2 — SIGNIFICANT CHANGE UP
EOSINOPHIL # BLD AUTO: 0.14 K/UL — SIGNIFICANT CHANGE UP (ref 0–0.5)
EOSINOPHIL NFR BLD AUTO: 1.6 % — SIGNIFICANT CHANGE UP (ref 0–6)
GLUCOSE SERPL-MCNC: 202 MG/DL — HIGH (ref 70–99)
HCT VFR BLD CALC: 34.8 % — SIGNIFICANT CHANGE UP (ref 34.5–45)
HGB BLD-MCNC: 10.5 G/DL — LOW (ref 11.5–15.5)
IMM GRANULOCYTES # BLD AUTO: 0.03 K/UL — SIGNIFICANT CHANGE UP (ref 0–0.07)
IMM GRANULOCYTES NFR BLD AUTO: 0.3 % — SIGNIFICANT CHANGE UP (ref 0–0.9)
LYMPHOCYTES # BLD AUTO: 2.41 K/UL — SIGNIFICANT CHANGE UP (ref 1–3.3)
LYMPHOCYTES NFR BLD AUTO: 27.9 % — SIGNIFICANT CHANGE UP (ref 13–44)
MAGNESIUM SERPL-MCNC: 1.9 MG/DL — SIGNIFICANT CHANGE UP (ref 1.6–2.6)
MCHC RBC-ENTMCNC: 26.4 PG — LOW (ref 27–34)
MCHC RBC-ENTMCNC: 30.2 G/DL — LOW (ref 32–36)
MCV RBC AUTO: 87.7 FL — SIGNIFICANT CHANGE UP (ref 80–100)
MONOCYTES # BLD AUTO: 0.6 K/UL — SIGNIFICANT CHANGE UP (ref 0–0.9)
MONOCYTES NFR BLD AUTO: 6.9 % — SIGNIFICANT CHANGE UP (ref 2–14)
NEUTROPHILS # BLD AUTO: 5.43 K/UL — SIGNIFICANT CHANGE UP (ref 1.8–7.4)
NEUTROPHILS NFR BLD AUTO: 62.8 % — SIGNIFICANT CHANGE UP (ref 43–77)
NRBC # BLD AUTO: 0 K/UL — SIGNIFICANT CHANGE UP (ref 0–0)
NRBC # FLD: 0 K/UL — SIGNIFICANT CHANGE UP (ref 0–0)
NRBC BLD AUTO-RTO: 0 /100 WBCS — SIGNIFICANT CHANGE UP (ref 0–0)
NT-PROBNP SERPL-SCNC: 49 PG/ML — SIGNIFICANT CHANGE UP
PLATELET # BLD AUTO: 144 K/UL — LOW (ref 150–400)
PMV BLD: 13.3 FL — HIGH (ref 7–13)
POTASSIUM SERPL-MCNC: 4.1 MMOL/L — SIGNIFICANT CHANGE UP (ref 3.5–5.3)
POTASSIUM SERPL-SCNC: 4.1 MMOL/L — SIGNIFICANT CHANGE UP (ref 3.5–5.3)
PROT SERPL-MCNC: 7.4 G/DL — SIGNIFICANT CHANGE UP (ref 6–8.3)
RBC # BLD: 3.97 M/UL — SIGNIFICANT CHANGE UP (ref 3.8–5.2)
RBC # FLD: 16.3 % — HIGH (ref 10.3–14.5)
SODIUM SERPL-SCNC: 136 MMOL/L — SIGNIFICANT CHANGE UP (ref 135–145)
TROPONIN T, HIGH SENSITIVITY RESULT: 14 NG/L — SIGNIFICANT CHANGE UP
TROPONIN T, HIGH SENSITIVITY RESULT: 16 NG/L — SIGNIFICANT CHANGE UP
WBC # BLD: 8.65 K/UL — SIGNIFICANT CHANGE UP (ref 3.8–10.5)
WBC # FLD AUTO: 8.65 K/UL — SIGNIFICANT CHANGE UP (ref 3.8–10.5)

## 2025-07-10 PROCEDURE — 99284 EMERGENCY DEPT VISIT MOD MDM: CPT

## 2025-07-10 PROCEDURE — 71046 X-RAY EXAM CHEST 2 VIEWS: CPT | Mod: 26

## 2025-07-10 PROCEDURE — 93970 EXTREMITY STUDY: CPT | Mod: 26

## 2025-07-10 NOTE — ED PROVIDER NOTE - NSFOLLOWUPCLINICS_GEN_ALL_ED_FT
Cardiology at [facility]  Cardiology  [address] [place], [place] [address]  Phone: [phone]  Fax:

## 2025-07-10 NOTE — ED PROVIDER NOTE - OBJECTIVE STATEMENT
80F PMHx HTN, T2DM, p/w 1 week of worsening bilateral leg swelling, saw PCP this afternoon who recommended ED visit. Pt and daughter provided history. Pt's daughter reports that both of her mothers legs have become swollen over the past week without any improvement. Pts daughter reports that her moms legs have occasionally become swollen in the past after periods of prolonged sitting such as at Islamic events, but the swelling has always resolved within a day or two and has never persisted this long. Pt denies any chest pain, SOB, nausea, or vomiting. Pt denies fevers or recent travel. Pt denies smoking or taking hormonal medications. Pt endorses some L knee pain that she believes is more related to her chronic arthritis rather than a new problem.

## 2025-07-10 NOTE — ED ADULT TRIAGE NOTE - AS PAIN REST
0 (no pain/absence of nonverbal indicators of pain)
The resident's documentation has been prepared under my direction and personally reviewed by me in its entirety. I confirm that the note above accurately reflects all work, treatment, procedures, and medical decision making performed by me.  Tena Sutton MD

## 2025-07-10 NOTE — ED PROVIDER NOTE - CLINICAL SUMMARY MEDICAL DECISION MAKING FREE TEXT BOX
80-year-old female, history of hypertension, hyperlipidemia, diabetes, presents to ED with 1 week of swelling to both legs.  Patient reports has had swelling on and off over time but usually not persistent and falls.  Current symptoms more persistent.  No chest pain, shortness of breath.  Denies recent fevers, chills, cough, congestion.  No history of smoking, prolonged periods of immobilization, hormone use, cancer history, prior DVT\PE.  Patient stable.  Patient well-appearing, no acute distress, heart regular rate and rhythm, lungs clear, abdomen soft and nontender, no gross motor or sensory deficits, 1+ pitting edema to bilateral lower extremities through ankles and mid calf.  Differential includes but limited to new heart failure, venous insufficiency, DVT.  Plan for lab work, cardiac markers, ECG, chest x-ray, bilateral lower extremity DVT study.

## 2025-07-10 NOTE — ED ADULT TRIAGE NOTE - CHIEF COMPLAINT QUOTE
Pt c/o BLE swelling x1 week. Denies any chest pain, sob upon exertion, recent injuries or falls. No redness noted on extremity. PMHx HTN, DM2.

## 2025-07-10 NOTE — ED PROVIDER NOTE - PHYSICAL EXAMINATION
T(C): 36.6 (07-10-25 @ 18:56), Max: 36.6 (07-10-25 @ 18:56)  HR: 93 (07-10-25 @ 18:56) (93 - 93)  BP: 132/73 (07-10-25 @ 18:56) (132/73 - 132/73)  RR: 18 (07-10-25 @ 18:56) (18 - 18)  SpO2: 99% (07-10-25 @ 18:56) (99% - 99%)    PHYSICAL EXAM:  GENERAL: NAD, well-groomed, well-developed  HEAD:  Atraumatic, Normocephalic  EYES: EOMI, conjunctiva and sclera clear, no jaundice   HEART: Regular rate and rhythm; No murmurs, rubs, or gallops. S1/S2 present  RESPIRATORY: CTA B/L, No W/R/R  ABDOMEN: Soft, Nontender, Nondistended; Bowel sounds present. No hepatosplenomegally  NEUROLOGY: A&Ox3, nonfocal, moving all extremities,  EXTREMITIES:  1+ tibial and pedal edema, no overlying erythema, warmth or skin changes. Distal pulses not appreciated bilaterally.  SKIN: warm, dry, normal color, no rash or abnormal lesions

## 2025-07-10 NOTE — ED ADULT NURSE NOTE - NSFALLHARMRISKINTERV_ED_ALL_ED
Assistance OOB with selected safe patient handling equipment if applicable/Communicate risk of Fall with Harm to all staff, patient, and family/Monitor gait and stability/Provide patient with walking aids/Provide visual cue: red socks, yellow wristband, yellow gown, etc/Reinforce activity limits and safety measures with patient and family/Bed in lowest position, wheels locked, appropriate side rails in place/Call bell, personal items and telephone in reach/Instruct patient to call for assistance before getting out of bed/chair/stretcher/Non-slip footwear applied when patient is off stretcher/[place] to call system/Physically safe environment - no spills, clutter or unnecessary equipment/Purposeful Proactive Rounding/Room/bathroom lighting operational, light cord in reach

## 2025-07-10 NOTE — ED PROVIDER NOTE - PROGRESS NOTE DETAILS
Michael PGY3: Workup without acute findings.  Serial tropes stable.  BNP negative.  Symptoms likely secondary to venous insufficiency.  Will DC with outpatient follow-up.

## 2025-07-10 NOTE — ED ADULT NURSE NOTE - OBJECTIVE STATEMENT
Patient came in sent in by her PCP for b/l leg swelling. As per family at bedside, patient has swelling from 7 days and she went to her PCP and they send her to ED for further evaluation. Patient also endorses left lower leg pain while walking. Denies chest pain/sob. No other complaints. No distress noted. specimens collected and sent. Patient is placed on cardiac monitor with continues pulse ox. Nursing care continues

## 2025-07-10 NOTE — ED ADULT NURSE REASSESSMENT NOTE - NS ED NURSE REASSESS COMMENT FT1
Pt resting in the stretcher. Pt pending ultra sound results. Respirations are equal and unlabored bilaterally. stretcher is in the lowest position and safety maintained.

## 2025-07-10 NOTE — ED PROVIDER NOTE - ATTENDING CONTRIBUTION TO CARE
80-year-old female past medical history of hypertension, type 2 diabetes presents with bilateral leg swelling.  PCP advised that she come to the ED.  Patient did have 2+ pitting edema of bilateral legs, lungs clear to auscultation bilaterally regular rate and rhythm no murmurs rubs or gallops.  Patient was ruled out for DVT or acute onset CHF.  Patient was stable for discharge.

## 2025-07-10 NOTE — ED ADULT NURSE REASSESSMENT NOTE - NS ED NURSE REASSESS COMMENT FT1
Pt resting in the stretcher. Family is at the bedside. Pt is pending Lab results. stretcher is in the lowest position and safety maintained.

## 2025-07-10 NOTE — ED PROVIDER NOTE - NSICDXPASTMEDICALHX_GEN_ALL_CORE_FT
PAST MEDICAL HISTORY:  Breast cancer     Diabetes mellitus     Hyperlipidemia     Hypertension     Osteoarthritis     Osteoporosis

## 2025-07-10 NOTE — ED PROVIDER NOTE - PATIENT PORTAL LINK FT
You can access the FollowMyHealth Patient Portal offered by [facility] by registering at the following website: [email or web address]. By joining [facility]’s FollowMyHealth portal, you will also be able to view your health information using other applications (apps) compatible with our system.

## 2025-07-10 NOTE — ED PROVIDER NOTE - NSFOLLOWUPINSTRUCTIONS_ED_ALL_ED_FT
You were seen in the Emergency Department for swelling to both legs.  Your evaluation today shows your symptoms are not from any dangerous or life-threatening causes.   Your symptoms are likely caused by venous insuffiencey which are leaky valves in your veins.  There is nothing emergent to do about this.  We recommend using compression stocking and keeping your legs elevated when you can.      1) Continue all previously prescribed medications as directed.    2) Follow up with your primary care physician - take copies of your results.    3) Return to the Emergency Department for worsening or persistent symptoms, and/or ANY NEW OR CONCERNING SYMPTOMS.

## 2025-07-31 ENCOUNTER — NON-APPOINTMENT (OUTPATIENT)
Age: 80
End: 2025-07-31

## 2025-07-31 ENCOUNTER — APPOINTMENT (OUTPATIENT)
Dept: CARDIOLOGY | Facility: CLINIC | Age: 80
End: 2025-07-31
Payer: MEDICAID

## 2025-07-31 VITALS
BODY MASS INDEX: 30.02 KG/M2 | OXYGEN SATURATION: 95 % | WEIGHT: 143 LBS | HEIGHT: 58 IN | SYSTOLIC BLOOD PRESSURE: 144 MMHG | TEMPERATURE: 97.5 F | DIASTOLIC BLOOD PRESSURE: 79 MMHG | HEART RATE: 85 BPM

## 2025-07-31 VITALS — SYSTOLIC BLOOD PRESSURE: 152 MMHG | DIASTOLIC BLOOD PRESSURE: 88 MMHG

## 2025-07-31 DIAGNOSIS — Z86.79 PERSONAL HISTORY OF OTHER DISEASES OF THE CIRCULATORY SYSTEM: ICD-10-CM

## 2025-07-31 DIAGNOSIS — I10 ESSENTIAL (PRIMARY) HYPERTENSION: ICD-10-CM

## 2025-07-31 PROCEDURE — 93000 ELECTROCARDIOGRAM COMPLETE: CPT

## 2025-07-31 PROCEDURE — 99203 OFFICE O/P NEW LOW 30 MIN: CPT

## 2025-07-31 RX ORDER — LOSARTAN POTASSIUM 50 MG/1
50 TABLET, FILM COATED ORAL
Qty: 60 | Refills: 0 | Status: ACTIVE | COMMUNITY

## 2025-07-31 RX ORDER — METFORMIN HYDROCHLORIDE 1000 MG/1
1000 TABLET, COATED ORAL TWICE DAILY
Refills: 0 | Status: ACTIVE | COMMUNITY

## 2025-07-31 RX ORDER — FUROSEMIDE 20 MG/1
20 TABLET ORAL DAILY
Qty: 90 | Refills: 3 | Status: ACTIVE | COMMUNITY

## 2025-07-31 RX ORDER — LINAGLIPTIN 5 MG/1
5 TABLET, FILM COATED ORAL DAILY
Refills: 0 | Status: ACTIVE | COMMUNITY

## 2025-07-31 RX ORDER — SULFASALAZINE 500 MG
TABLET ORAL
Refills: 0 | Status: ACTIVE | COMMUNITY

## 2025-07-31 RX ORDER — IBUPROFEN 200 MG/1
200 TABLET ORAL TWICE DAILY
Refills: 0 | Status: ACTIVE | COMMUNITY

## 2025-07-31 RX ORDER — ACETAMINOPHEN 325 MG/1
TABLET ORAL
Refills: 0 | Status: ACTIVE | COMMUNITY

## 2025-08-28 ENCOUNTER — APPOINTMENT (OUTPATIENT)
Dept: RHEUMATOLOGY | Facility: CLINIC | Age: 80
End: 2025-08-28
Payer: MEDICAID

## 2025-08-28 ENCOUNTER — NON-APPOINTMENT (OUTPATIENT)
Age: 80
End: 2025-08-28

## 2025-08-28 VITALS
RESPIRATION RATE: 15 BRPM | HEART RATE: 91 BPM | WEIGHT: 146 LBS | HEIGHT: 58 IN | OXYGEN SATURATION: 96 % | BODY MASS INDEX: 30.64 KG/M2 | SYSTOLIC BLOOD PRESSURE: 152 MMHG | TEMPERATURE: 97 F | DIASTOLIC BLOOD PRESSURE: 87 MMHG

## 2025-08-28 DIAGNOSIS — M06.00 RHEUMATOID ARTHRITIS W/OUT RHEUMATOID FACTOR, UNSPECIFIED SITE: ICD-10-CM

## 2025-08-28 DIAGNOSIS — M17.9 OSTEOARTHRITIS OF KNEE, UNSPECIFIED: ICD-10-CM

## 2025-08-28 PROCEDURE — 99214 OFFICE O/P EST MOD 30 MIN: CPT

## 2025-08-28 PROCEDURE — G2211 COMPLEX E/M VISIT ADD ON: CPT | Mod: NC

## 2025-08-28 RX ORDER — SULFASALAZINE 500 MG/1
500 TABLET ORAL TWICE DAILY
Qty: 180 | Refills: 1 | Status: ACTIVE | COMMUNITY
Start: 2025-08-28 | End: 1900-01-01